# Patient Record
Sex: FEMALE | Race: WHITE | NOT HISPANIC OR LATINO | Employment: OTHER | URBAN - METROPOLITAN AREA
[De-identification: names, ages, dates, MRNs, and addresses within clinical notes are randomized per-mention and may not be internally consistent; named-entity substitution may affect disease eponyms.]

---

## 2017-01-09 ENCOUNTER — GENERIC CONVERSION - ENCOUNTER (OUTPATIENT)
Dept: OTHER | Facility: OTHER | Age: 82
End: 2017-01-09

## 2017-02-08 ENCOUNTER — GENERIC CONVERSION - ENCOUNTER (OUTPATIENT)
Dept: OTHER | Facility: OTHER | Age: 82
End: 2017-02-08

## 2017-02-17 ENCOUNTER — GENERIC CONVERSION - ENCOUNTER (OUTPATIENT)
Dept: OTHER | Facility: OTHER | Age: 82
End: 2017-02-17

## 2017-02-24 ENCOUNTER — GENERIC CONVERSION - ENCOUNTER (OUTPATIENT)
Dept: OTHER | Facility: OTHER | Age: 82
End: 2017-02-24

## 2017-02-24 ENCOUNTER — LAB CONVERSION - ENCOUNTER (OUTPATIENT)
Dept: OTHER | Facility: OTHER | Age: 82
End: 2017-02-24

## 2017-02-24 LAB — HBA1C MFR BLD HPLC: 7.6 % OF TOTAL HGB

## 2017-03-31 ENCOUNTER — GENERIC CONVERSION - ENCOUNTER (OUTPATIENT)
Dept: OTHER | Facility: OTHER | Age: 82
End: 2017-03-31

## 2017-04-17 ENCOUNTER — GENERIC CONVERSION - ENCOUNTER (OUTPATIENT)
Dept: OTHER | Facility: OTHER | Age: 82
End: 2017-04-17

## 2017-04-28 ENCOUNTER — ALLSCRIPTS OFFICE VISIT (OUTPATIENT)
Dept: OTHER | Facility: OTHER | Age: 82
End: 2017-04-28

## 2017-06-21 ENCOUNTER — HOSPITAL ENCOUNTER (OUTPATIENT)
Dept: BONE DENSITY | Facility: IMAGING CENTER | Age: 82
Discharge: HOME | End: 2017-06-21
Payer: COMMERCIAL

## 2017-06-21 DIAGNOSIS — Z12.31 ENCOUNTER FOR SCREENING MAMMOGRAM FOR MALIGNANT NEOPLASM OF BREAST: ICD-10-CM

## 2017-06-21 PROCEDURE — G0202 SCR MAMMO BI INCL CAD: HCPCS

## 2017-07-03 ENCOUNTER — ALLSCRIPTS OFFICE VISIT (OUTPATIENT)
Dept: OTHER | Facility: OTHER | Age: 82
End: 2017-07-03

## 2017-08-03 ENCOUNTER — GENERIC CONVERSION - ENCOUNTER (OUTPATIENT)
Dept: OTHER | Facility: OTHER | Age: 82
End: 2017-08-03

## 2017-08-03 ENCOUNTER — LAB CONVERSION - ENCOUNTER (OUTPATIENT)
Dept: OTHER | Facility: OTHER | Age: 82
End: 2017-08-03

## 2017-08-03 LAB
A/G RATIO (HISTORICAL): 1.3 (CALC) (ref 1–2.5)
ALBUMIN SERPL BCP-MCNC: 3.8 G/DL (ref 3.6–5.1)
ALP SERPL-CCNC: 52 U/L (ref 33–130)
ALT SERPL W P-5'-P-CCNC: 101 U/L (ref 6–29)
AST SERPL W P-5'-P-CCNC: 77 U/L (ref 10–35)
BILIRUB SERPL-MCNC: 0.6 MG/DL (ref 0.2–1.2)
BUN SERPL-MCNC: 14 MG/DL (ref 7–25)
BUN/CREA RATIO (HISTORICAL): ABNORMAL (CALC) (ref 6–22)
CALCIUM SERPL-MCNC: 9.1 MG/DL (ref 8.6–10.4)
CHLORIDE SERPL-SCNC: 100 MMOL/L (ref 98–110)
CO2 SERPL-SCNC: 31 MMOL/L (ref 20–31)
CREAT SERPL-MCNC: 0.87 MG/DL (ref 0.6–0.88)
EGFR AFRICAN AMERICAN (HISTORICAL): 73 ML/MIN/1.73M2
EGFR-AMERICAN CALC (HISTORICAL): 63 ML/MIN/1.73M2
GAMMA GLOBULIN (HISTORICAL): 3 G/DL (CALC) (ref 1.9–3.7)
GLUCOSE (HISTORICAL): 154 MG/DL (ref 65–99)
HBA1C MFR BLD HPLC: 8 % OF TOTAL HGB
POTASSIUM SERPL-SCNC: 3.9 MMOL/L (ref 3.5–5.3)
SODIUM SERPL-SCNC: 137 MMOL/L (ref 135–146)
TOTAL PROTEIN (HISTORICAL): 6.8 G/DL (ref 6.1–8.1)

## 2017-08-14 ENCOUNTER — GENERIC CONVERSION - ENCOUNTER (OUTPATIENT)
Dept: OTHER | Facility: OTHER | Age: 82
End: 2017-08-14

## 2017-09-21 ENCOUNTER — TRANSCRIBE ORDERS (OUTPATIENT)
Dept: ADMINISTRATIVE | Facility: HOSPITAL | Age: 82
End: 2017-09-21

## 2017-09-21 ENCOUNTER — ALLSCRIPTS OFFICE VISIT (OUTPATIENT)
Dept: OTHER | Facility: OTHER | Age: 82
End: 2017-09-21

## 2017-09-21 DIAGNOSIS — Z12.31 VISIT FOR SCREENING MAMMOGRAM: Primary | ICD-10-CM

## 2017-11-02 ENCOUNTER — GENERIC CONVERSION - ENCOUNTER (OUTPATIENT)
Dept: OTHER | Facility: OTHER | Age: 82
End: 2017-11-02

## 2017-11-02 ENCOUNTER — APPOINTMENT (OUTPATIENT)
Dept: NON INVASIVE DIAGNOSTICS | Facility: HOSPITAL | Age: 82
End: 2017-11-02
Payer: COMMERCIAL

## 2017-11-02 ENCOUNTER — APPOINTMENT (EMERGENCY)
Dept: RADIOLOGY | Facility: HOSPITAL | Age: 82
End: 2017-11-02
Payer: COMMERCIAL

## 2017-11-02 ENCOUNTER — HOSPITAL ENCOUNTER (OUTPATIENT)
Facility: HOSPITAL | Age: 82
Setting detail: OBSERVATION
Discharge: HOME | End: 2017-11-02
Attending: EMERGENCY MEDICINE | Admitting: INTERNAL MEDICINE
Payer: COMMERCIAL

## 2017-11-02 VITALS
TEMPERATURE: 98.4 F | WEIGHT: 174.38 LBS | OXYGEN SATURATION: 95 % | RESPIRATION RATE: 18 BRPM | DIASTOLIC BLOOD PRESSURE: 68 MMHG | BODY MASS INDEX: 29.77 KG/M2 | SYSTOLIC BLOOD PRESSURE: 122 MMHG | HEART RATE: 82 BPM | HEIGHT: 64 IN

## 2017-11-02 DIAGNOSIS — R07.9 CHEST PAIN: Primary | ICD-10-CM

## 2017-11-02 PROBLEM — E78.5 HYPERLIPIDEMIA: Status: ACTIVE | Noted: 2017-11-02

## 2017-11-02 PROBLEM — E11.9 DIABETES MELLITUS (HCC): Status: ACTIVE | Noted: 2017-11-02

## 2017-11-02 PROBLEM — I10 HYPERTENSION: Status: ACTIVE | Noted: 2017-11-02

## 2017-11-02 LAB
ANION GAP SERPL CALCULATED.3IONS-SCNC: 7 MMOL/L (ref 4–13)
ATRIAL RATE: 81 BPM
ATRIAL RATE: 83 BPM
BASOPHILS # BLD AUTO: 0.03 THOUSANDS/ΜL (ref 0–0.1)
BASOPHILS NFR BLD AUTO: 0 % (ref 0–1)
BUN SERPL-MCNC: 15 MG/DL (ref 5–25)
CALCIUM SERPL-MCNC: 8.8 MG/DL (ref 8.3–10.1)
CHLORIDE SERPL-SCNC: 101 MMOL/L (ref 100–108)
CO2 SERPL-SCNC: 30 MMOL/L (ref 21–32)
CREAT SERPL-MCNC: 0.97 MG/DL (ref 0.6–1.3)
EOSINOPHIL # BLD AUTO: 0.13 THOUSAND/ΜL (ref 0–0.61)
EOSINOPHIL NFR BLD AUTO: 2 % (ref 0–6)
ERYTHROCYTE [DISTWIDTH] IN BLOOD BY AUTOMATED COUNT: 15.3 % (ref 11.6–15.1)
GFR SERPL CREATININE-BSD FRML MDRD: 55 ML/MIN/1.73SQ M
GLUCOSE SERPL-MCNC: 171 MG/DL (ref 65–140)
HCT VFR BLD AUTO: 40.7 % (ref 34.8–46.1)
HGB BLD-MCNC: 13.1 G/DL (ref 11.5–15.4)
LYMPHOCYTES # BLD AUTO: 2.5 THOUSANDS/ΜL (ref 0.6–4.47)
LYMPHOCYTES NFR BLD AUTO: 34 % (ref 14–44)
MCH RBC QN AUTO: 28.1 PG (ref 26.8–34.3)
MCHC RBC AUTO-ENTMCNC: 32.2 G/DL (ref 31.4–37.4)
MCV RBC AUTO: 87 FL (ref 82–98)
MONOCYTES # BLD AUTO: 0.6 THOUSAND/ΜL (ref 0.17–1.22)
MONOCYTES NFR BLD AUTO: 8 % (ref 4–12)
NEUTROPHILS # BLD AUTO: 4.12 THOUSANDS/ΜL (ref 1.85–7.62)
NEUTS SEG NFR BLD AUTO: 56 % (ref 43–75)
P AXIS: 61 DEGREES
P AXIS: 63 DEGREES
PLATELET # BLD AUTO: 191 THOUSANDS/UL (ref 149–390)
PMV BLD AUTO: 10.5 FL (ref 8.9–12.7)
POTASSIUM SERPL-SCNC: 3.6 MMOL/L (ref 3.5–5.3)
PR INTERVAL: 168 MS
PR INTERVAL: 168 MS
QRS AXIS: -33 DEGREES
QRS AXIS: -37 DEGREES
QRSD INTERVAL: 104 MS
QRSD INTERVAL: 106 MS
QT INTERVAL: 384 MS
QT INTERVAL: 414 MS
QTC INTERVAL: 446 MS
QTC INTERVAL: 486 MS
RBC # BLD AUTO: 4.67 MILLION/UL (ref 3.81–5.12)
SODIUM SERPL-SCNC: 138 MMOL/L (ref 136–145)
T WAVE AXIS: 116 DEGREES
T WAVE AXIS: 82 DEGREES
TROPONIN I SERPL-MCNC: <0.02 NG/ML
VENTRICULAR RATE: 81 BPM
VENTRICULAR RATE: 83 BPM
WBC # BLD AUTO: 7.38 THOUSAND/UL (ref 4.31–10.16)

## 2017-11-02 PROCEDURE — G8978 MOBILITY CURRENT STATUS: HCPCS

## 2017-11-02 PROCEDURE — 84484 ASSAY OF TROPONIN QUANT: CPT | Performed by: EMERGENCY MEDICINE

## 2017-11-02 PROCEDURE — 80048 BASIC METABOLIC PNL TOTAL CA: CPT | Performed by: EMERGENCY MEDICINE

## 2017-11-02 PROCEDURE — 93005 ELECTROCARDIOGRAM TRACING: CPT | Performed by: EMERGENCY MEDICINE

## 2017-11-02 PROCEDURE — 36415 COLL VENOUS BLD VENIPUNCTURE: CPT | Performed by: EMERGENCY MEDICINE

## 2017-11-02 PROCEDURE — 99285 EMERGENCY DEPT VISIT HI MDM: CPT

## 2017-11-02 PROCEDURE — 94760 N-INVAS EAR/PLS OXIMETRY 1: CPT

## 2017-11-02 PROCEDURE — 97162 PT EVAL MOD COMPLEX 30 MIN: CPT

## 2017-11-02 PROCEDURE — 71020 HB CHEST X-RAY 2VW FRONTAL&LATL: CPT

## 2017-11-02 PROCEDURE — 93306 TTE W/DOPPLER COMPLETE: CPT

## 2017-11-02 PROCEDURE — 84484 ASSAY OF TROPONIN QUANT: CPT | Performed by: INTERNAL MEDICINE

## 2017-11-02 PROCEDURE — G8979 MOBILITY GOAL STATUS: HCPCS

## 2017-11-02 PROCEDURE — 85025 COMPLETE CBC W/AUTO DIFF WBC: CPT | Performed by: EMERGENCY MEDICINE

## 2017-11-02 PROCEDURE — 93005 ELECTROCARDIOGRAM TRACING: CPT

## 2017-11-02 PROCEDURE — G8980 MOBILITY D/C STATUS: HCPCS

## 2017-11-02 RX ORDER — PANTOPRAZOLE SODIUM 40 MG/1
40 TABLET, DELAYED RELEASE ORAL
Status: DISCONTINUED | OUTPATIENT
Start: 2017-11-03 | End: 2017-11-02 | Stop reason: HOSPADM

## 2017-11-02 RX ORDER — HEPARIN SODIUM 5000 [USP'U]/ML
5000 INJECTION, SOLUTION INTRAVENOUS; SUBCUTANEOUS EVERY 8 HOURS SCHEDULED
Status: DISCONTINUED | OUTPATIENT
Start: 2017-11-02 | End: 2017-11-02 | Stop reason: HOSPADM

## 2017-11-02 RX ORDER — UREA 10 %
100 LOTION (ML) TOPICAL DAILY
Status: DISCONTINUED | OUTPATIENT
Start: 2017-11-02 | End: 2017-11-02 | Stop reason: HOSPADM

## 2017-11-02 RX ORDER — ACETAMINOPHEN 325 MG/1
650 TABLET ORAL EVERY 6 HOURS PRN
Status: DISCONTINUED | OUTPATIENT
Start: 2017-11-02 | End: 2017-11-02 | Stop reason: HOSPADM

## 2017-11-02 RX ORDER — METOPROLOL SUCCINATE 25 MG/1
25 TABLET, EXTENDED RELEASE ORAL DAILY
Status: DISCONTINUED | OUTPATIENT
Start: 2017-11-02 | End: 2017-11-02 | Stop reason: HOSPADM

## 2017-11-02 RX ORDER — ATORVASTATIN CALCIUM 40 MG/1
40 TABLET, FILM COATED ORAL
Status: DISCONTINUED | OUTPATIENT
Start: 2017-11-02 | End: 2017-11-02 | Stop reason: HOSPADM

## 2017-11-02 RX ORDER — ASPIRIN 81 MG/1
81 TABLET ORAL DAILY
Status: DISCONTINUED | OUTPATIENT
Start: 2017-11-02 | End: 2017-11-02 | Stop reason: HOSPADM

## 2017-11-02 RX ORDER — NITROGLYCERIN 0.4 MG/1
0.4 TABLET SUBLINGUAL
Status: DISCONTINUED | OUTPATIENT
Start: 2017-11-02 | End: 2017-11-02 | Stop reason: HOSPADM

## 2017-11-02 RX ORDER — AMITRIPTYLINE HYDROCHLORIDE 10 MG/1
10 TABLET, FILM COATED ORAL
Status: DISCONTINUED | OUTPATIENT
Start: 2017-11-02 | End: 2017-11-02 | Stop reason: HOSPADM

## 2017-11-02 RX ORDER — INDAPAMIDE 1.25 MG/1
1.25 TABLET, FILM COATED ORAL DAILY
Status: DISCONTINUED | OUTPATIENT
Start: 2017-11-02 | End: 2017-11-02 | Stop reason: HOSPADM

## 2017-11-02 RX ORDER — ROPINIROLE 2 MG/1
2 TABLET, FILM COATED ORAL
Status: DISCONTINUED | OUTPATIENT
Start: 2017-11-02 | End: 2017-11-02 | Stop reason: HOSPADM

## 2017-11-02 RX ORDER — 0.9 % SODIUM CHLORIDE 0.9 %
3 VIAL (ML) INJECTION AS NEEDED
Status: DISCONTINUED | OUTPATIENT
Start: 2017-11-02 | End: 2017-11-02 | Stop reason: HOSPADM

## 2017-11-02 RX ORDER — ONDANSETRON 2 MG/ML
4 INJECTION INTRAMUSCULAR; INTRAVENOUS EVERY 6 HOURS PRN
Status: DISCONTINUED | OUTPATIENT
Start: 2017-11-02 | End: 2017-11-02 | Stop reason: HOSPADM

## 2017-11-02 NOTE — ED PROVIDER NOTES
History  Chief Complaint   Patient presents with    Chest Pain     Patient presents to the ER stating she started with left sided chest pain yesterday, went away then woke her up this am  presently denies any pain, N, V, or diaporesis or SOB  History provided by:  Patient   used: No       patient is a 74-year-old female presenting to the emergency department with chest pain  Started today morning  Woke up from sleep  Last for 2 hours  Associated diaphoresis  No nausea no lightheadedness no shortness of breath  Had similar pain 4 days ago  Took aspirin and felt better  Has not had any recent stress test or catheterization is  No stents  Has diabetes  Hyperlipidemia  No smoking  Pain is dull with episodes of shortness  No calf pain or swelling  No history of DVT  No cough  No bloody sputum  No fevers  MDM  Cardiac workup, admission to hospital for observation      Prior to Admission Medications   Prescriptions Last Dose Informant Patient Reported? Taking? Co-Enzyme Q10 200 MG CAPS 11/1/2017 at Unknown time Self Yes Yes   Sig: Co-Enzyme Q-10 100 MG Oral Capsule   Refills: 0      , M D ; Active   Empagliflozin (JARDIANCE PO) 11/1/2017 at Unknown time  Yes Yes   Sig: Take by mouth daily     GABAPENTIN PO 11/1/2017 at Unknown time Self Yes Yes   Sig: Take by mouth 3 (three) times a day     Multiple Vitamin (MULTI VITAMIN DAILY PO) 11/1/2017 at Unknown time  Yes Yes   Sig: Take by mouth   amitriptyline (ELAVIL) 10 mg tablet 11/1/2017 at Unknown time Self Yes Yes   Sig: Take by mouth 2 (two) times a day     ascorbic acid (VITAMIN C) 250 mg tablet 11/1/2017 at Unknown time  Yes Yes   Sig: Take 250 mg by mouth daily   aspirin 81 MG tablet 11/1/2017 at Unknown time  Yes Yes   Sig: Aspirin 81 MG Oral Tablet  Take 1 tablet daily   Refills: 0       Be, Joe Unger ;  Active   atorvastatin (LIPITOR) 40 mg tablet 11/1/2017 at Unknown time  Yes Yes   Sig: Take by mouth daily cyanocobalamin ([employer] VITAMIN B-12) 100 MCG tablet 11/1/2017 at Unknown time  Yes Yes   Sig: Take by mouth daily     hyoscyamine (LEVBID) 0 375 mg 12 hr tablet 11/1/2017 at Unknown time  Yes Yes   Sig: Take by mouth daily at bedtime     indapamide (LOZOL) 1 25 mg tablet 11/1/2017 at Unknown time  Yes Yes   Sig: Take 1 25 mg by mouth daily     metoprolol succinate (TOPROL-XL) 25 mg 24 hr tablet 11/1/2017 at Unknown time  Yes Yes   Sig: Take by mouth daily     rOPINIRole (REQUIP) 2 mg tablet 11/1/2017 at Unknown time  Yes Yes   Sig: Take 2 mg by mouth daily at bedtime     sitaGLIPtin (JANUVIA) 100 mg tablet 11/1/2017 at Unknown time  Yes Yes   Sig: Take 100 mg by mouth daily        Facility-Administered Medications: None       Past Medical History:   Diagnosis Date    Breast cancer ([facility] [address] ) 2005    h/o     Cataract     OD    Diabetes mellitus ([facility] [address] )     Full dentures     Heart palpitations     Hyperlipidemia     Hypertension     Limb alert care status     LEFT    Wears glasses        Past Surgical History:   Procedure Laterality Date    BREAST SURGERY      CATARACT EXTRACTION W/  INTRAOCULAR LENS IMPLANT Left     CHOLECYSTECTOMY      HERNIA REPAIR      HYSTERECTOMY      JOINT REPLACEMENT      fito knee replacements    MASTECTOMY Left     w/SLN bx    LA REMV CATARACT EXTRACAP,INSERT LENS Right 11/16/2016    Procedure: EXTRACTION EXTRACAPSULAR CATARACT PHACO INTRAOCULAR LENS (IOL); Surgeon: Lashon Rodriguez MD;  Location: [facility];  Service: Ophthalmology    TONSILLECTOMY      VAGINAL DELIVERY      X 4       History reviewed  No pertinent family history  I have reviewed and agree with the history as documented  Social History   Substance Use Topics    Smoking status: Never Smoker    Smokeless tobacco: Never Used    Alcohol use No        Review of Systems   Constitutional: Positive for diaphoresis  Negative for chills and fever  HENT: Negative for congestion and sore throat      Respiratory: Negative for cough, shortness of breath, wheezing and stridor  Cardiovascular: Positive for chest pain  Negative for palpitations and leg swelling  Gastrointestinal: Negative for abdominal pain, blood in stool, diarrhea, nausea and vomiting  Genitourinary: Negative for dysuria, frequency and urgency  Musculoskeletal: Negative for neck pain and neck stiffness  Skin: Negative for pallor and rash  Neurological: Negative for dizziness, syncope, weakness, light-headedness and headaches  All other systems reviewed and are negative  Physical Exam  ED Triage Vitals [11/02/17 0945]   Temperature Pulse Respirations Blood Pressure SpO2   98 3 °F (36 8 °C) 88 18 142/70 100 %      Temp Source Heart Rate Source Patient Position - Orthostatic VS BP Location FiO2 (%)   Tympanic Monitor Lying Right arm --      Pain Score       No Pain           Orthostatic Vital Signs  Vitals:    11/02/17 1215 11/02/17 1230 11/02/17 1253 11/02/17 1510   BP: 124/66  131/64 122/68   Pulse: 77 78 80 82   Patient Position - Orthostatic VS: Lying  Lying Lying       Physical Exam   Constitutional: She is oriented to person, place, and time  She appears well-developed and well-nourished  HENT:   Head: Normocephalic and atraumatic  Eyes: EOM are normal  Pupils are equal, round, and reactive to light  Neck: Normal range of motion  Neck supple  Cardiovascular: Normal rate, regular rhythm, normal heart sounds and intact distal pulses  Pulmonary/Chest: Effort normal and breath sounds normal  No respiratory distress  Abdominal: Soft  Bowel sounds are normal  There is no tenderness  Musculoskeletal: Normal range of motion  She exhibits no edema or tenderness  Neurological: She is alert and oriented to person, place, and time  Skin: Skin is warm and dry  Capillary refill takes less than 2 seconds  No rash noted  No erythema  Vitals reviewed        ED Medications  Medications - No data to display    Diagnostic Studies  Results Reviewed     Procedure Component Value Units Date/Time    Troponin I [[id number]]  (Normal) Collected:  11/02/17 1002    Lab Status:  Final result Specimen:  Blood from Arm, Right Updated:  11/02/17 1054     Troponin I <0 02 ng/mL     Narrative:         [employer] Chemistry analyzer 99% cutoff is > 0 04 ng/mL in network labs    o cTnI 99% cutoff is useful only when applied to patients in the clinical setting of myocardial ischemia  o cTnI 99% cutoff should be interpreted in the context of clinical history, ECG findings and possibly cardiac imaging to establish correct diagnosis  o cTnI 99% cutoff may be suggestive but clearly not indicative of a coronary event without the clinical setting of myocardial ischemia  Basic metabolic panel [[id number]]  (Abnormal) Collected:  11/02/17 1002    Lab Status:  Final result Specimen:  Blood from Arm, Right Updated:  11/02/17 1044     Sodium 138 mmol/L      Potassium 3 6 mmol/L      Chloride 101 mmol/L      CO2 30 mmol/L      Anion Gap 7 mmol/L      BUN 15 mg/dL      Creatinine 0 97 mg/dL      Glucose 171 (H) mg/dL      Calcium 8 8 mg/dL      eGFR 55 ml/min/1 73sq m     Narrative:         National Kidney Disease Education Program recommendations are as follows:  GFR calculation is accurate only with a steady state creatinine  Chronic Kidney disease less than 60 ml/min/1 73 sq  meters  Kidney failure less than 15 ml/min/1 73 sq  meters      CBC and differential [[id number]]  (Abnormal) Collected:  11/02/17 1002    Lab Status:  Final result Specimen:  Blood from Arm, Right Updated:  11/02/17 1034     WBC 7 38 Thousand/uL      RBC 4 67 Million/uL      Hemoglobin 13 1 g/dL      Hematocrit 40 7 %      MCV 87 fL      MCH 28 1 pg      MCHC 32 2 g/dL      RDW 15 3 (H) %      MPV 10 5 fL      Platelets 999 Thousands/uL      Neutrophils Relative 56 %      Lymphocytes Relative 34 %      Monocytes Relative 8 %      Eosinophils Relative 2 %      Basophils Relative 0 % Neutrophils Absolute 4 12 Thousands/µL      Lymphocytes Absolute 2 50 Thousands/µL      Monocytes Absolute 0 60 Thousand/µL      Eosinophils Absolute 0 13 Thousand/µL      Basophils Absolute 0 03 Thousands/µL                  X-ray chest 2 views   Final Result by Abelardo Andre DO (11/02 1026)      No active pulmonary disease  Workstation performed: [id number]                    Procedures  Procedures       Phone Contacts  ED Phone Contact    ED Course  ED Course as of Nov 03 0710   Thu Nov 02, 2017   0957  ECG shows normal sinus rhythm,  left axis deviation axis,  white QRS, nonspecific, nonspecific ST and T changes compared to previous EKG flipped T-wave in aVL    1014  Patient took aspirin before coming to ER, full dose          HEART Risk Score    Flowsheet Row Most Recent Value   History  1 Filed at: 11/02/2017 0955   ECG  1 Filed at: 11/02/2017 0748   Age  2 Filed at: 11/02/2017 0955   Risk Factors  1 Filed at: 11/02/2017 0955   Troponin  0 Filed at: 11/02/2017 0955   Heart Score Risk Calculator   History  1 Filed at: 11/02/2017 0955   ECG  1 Filed at: 11/02/2017 0955   Age  2 Filed at: 11/02/2017 0955   Risk Factors  1 Filed at: 11/02/2017 0955   Troponin  0 Filed at: 11/02/2017 0955   HEART Score  5 Filed at: 11/02/2017 0955   HEART Score  5 Filed at: 11/02/2017 4134                            MDM  CritCare Time    Disposition  Final diagnoses:   Chest pain     Time reflects when diagnosis was documented in both MDM as applicable and the Disposition within this note     Time User Action Codes Description Comment    11/2/2017 11:49 AM Judy Cortes Add [R07 9] Chest pain     11/2/2017  1:15 PM John Ramachandran Modify [R07 9] Chest pain       ED Disposition     ED Disposition Condition Comment    Admit  Case was discussed with LAURA and the patient's admission status was agreed to be Admission Status: observation status to the service of Dr Madelyn Crabtree           Follow-up Information     Follow up With Specialties Details Why Contact Info    Martha Palacios MD Family Medicine Follow up in 1 week(s)  [address]  [facility] [address] [facility]      Zohra Rogers MD Cardiology, Multidisciplinary Follow up in 1 week(s)  [address] [facility]  [facility] [address]  [phone]          Discharge Medication List as of 11/2/2017  6:26 PM      CONTINUE these medications which have NOT CHANGED    Details   amitriptyline (ELAVIL) 10 mg tablet Take by mouth 2 (two) times a day  , Starting Thu 1/14/2016, Historical Med      ascorbic acid (VITAMIN C) 250 mg tablet Take 250 mg by mouth daily, Until Discontinued, Historical Med      aspirin 81 MG tablet Aspirin 81 MG Oral Tablet  Take 1 tablet daily   Refills: 0       Be, Juanito Aceves ;  Active, Historical Med      atorvastatin (LIPITOR) 40 mg tablet Take by mouth daily  , Starting Fri 5/20/2016, Historical Med      Co-Enzyme Q10 200 MG CAPS Co-Enzyme Q-10 100 MG Oral Capsule   Refills: 0      , M D ; Active, Historical Med      cyanocobalamin ([employer] VITAMIN B-12) 100 MCG tablet Take by mouth daily  , Starting Thu 1/14/2016, Historical Med      Empagliflozin (JARDIANCE PO) Take by mouth daily  , Historical Med      GABAPENTIN PO Take by mouth 3 (three) times a day  , Historical Med      hyoscyamine (LEVBID) 0 375 mg 12 hr tablet Take by mouth daily at bedtime  , Starting Thu 1/14/2016, Historical Med      indapamide (LOZOL) 1 25 mg tablet Take 1 25 mg by mouth daily  , Historical Med      metoprolol succinate (TOPROL-XL) 25 mg 24 hr tablet Take by mouth daily  , Historical Med      Multiple Vitamin (MULTI VITAMIN DAILY PO) Take by mouth, Starting 1/14/2016, Until Discontinued, Historical Med      rOPINIRole (REQUIP) 2 mg tablet Take 2 mg by mouth daily at bedtime  , Starting Thu 1/14/2016, Historical Med      sitaGLIPtin (JANUVIA) 100 mg tablet Take 100 mg by mouth daily  , Starting Tue 2/16/2016, Historical Med             Outpatient Discharge Orders  Discharge Diet     Activity as tolerated     Call provider for:  difficulty breathing, headache or visual disturbances     Call provider for:  persistent nausea or vomiting     Call provider for:  persistent dizziness or light-headedness         ED Provider  Electronically Signed by           Luis Enrique Saul MD  11/03/17 0774

## 2017-11-02 NOTE — PHYSICAL THERAPY NOTE
PT eval     17 1545   Note Type   Note type Eval only   Pain Assessment   Pain Assessment No/denies pain   Pain Score No Pain   Home Living   Type of [address] Two level   Additional Comments packing to move soon spouse  recently   Prior Function   Level of [place] Independent with ADLs and functional mobility   Lives With Alone   Receives Help From Family   ADL Assistance Independent   IADLs Independent   Falls in the last 6 months 0   Vocational Retired   Restrictions/Precautions   [facility] Bearing Precautions Per Order No   General   Additional Pertinent History adm under obs for 2 episodes of chest pain; cleared by cardiolgy and tent for d/c   Family/Caregiver Present No   Cognition   Overall Cognitive Status WFL   Arousal/Participation Alert   Orientation Level Oriented X4   Memory Within functional limits   Following Commands Follows all commands and directions without difficulty   Comments hhoping to be d/c later today   RUE Assessment   RUE Assessment WFL   LUE Assessment   LUE Assessment WFL   RLE Assessment   RLE Assessment WFL   LLE Assessment   LLE Assessment WFL   Coordination   Movements are Fluid and Coordinated 1   Bed Mobility   Rolling R 7  Independent   Rolling L 7  Independent   Supine to Sit 7  Independent   Sit to Supine 7  Independent   Transfers   Sit to Stand 7  Independent   Stand to Sit 7  Independent   Stand pivot 7  Independent   Ambulation/Elevation   Gait pattern WNL   Gait Assistance 7  Independent   Assistive Device None   Distance 250'   Balance   Static Sitting Normal   Dynamic Sitting Normal   Static Standing Normal   Dynamic Standing Normal   Ambulatory Normal   Endurance Deficit   Endurance Deficit No   Activity Tolerance   Activity Tolerance Patient tolerated treatment well   Medical Staff Made Aware yes    Nurse Made Aware yes VSS no c/os  feels fine   Assessment   Prognosis Excellent   Assessment PT adm under obs for chest pain   Presents as an ind ambulator without device  No deficits noted adn appropraite for home d/c  MD and RN told   d/c PT no needs   Goals   Patient Goals go home   Plan   PT Frequency (d/c PT)   Recommendation   Recommendation Home with family support   PT - OK to Discharge Yes   Modified Abebe Scale   Modified [place]rens Scale 0   Barthel Index   Feeding 10   Bathing 5   Grooming Score 5   Dressing Score 10   Bladder Score 10   Bowels Score 10   Toilet Use Score 10   Transfers (Bed/Chair) Score 15   Mobility (Level Surface) Score 15   Stairs Score 10   Barthel Index Score 100   Sharmaine Bernabe, PT

## 2017-11-02 NOTE — DISCHARGE SUMMARY
Discharge Summary - Sukumar Henao [de-identified] y o  female MRN: [id number]  Unit/Bed#: [address] [place] [address] Encounter: [id number]    Admission Date:    11/2/2017   Discharge Date:   11/02/17   Admitting Diagnosis:   Chest pain [R07 9]  Admitting Provider:   Froilan Huggins MD  Discharge Provider:   Froilan Huggins MD     Primary Care Physician at Discharge:   Jasper Sandhoff, RV,[phone]    HPI:   74-year-old female who presented to emergency department with chest pain  For a detailed HPI please refer to this writer's admission note  Procedures Performed:   No orders of the defined types were placed in this encounter  Hospital Course:  Patient was admitted to med surgical floor on telemetry for observation status  Patient was seen by cardiologist Dr Corey Choudhary  Cardiologist thought patient's symptoms are likely related to gastroesophageal reflux and mechanical chest wall pain  He advised to discharge patient on PPI and he will arrange for outpatient follow-up  He did not think any testing is needed at this point  Patient the 3rd troponin was also negative  She remained chest pain-free  She was seen by Physical therapy and did not require any home care services  Patient will be discharged home in stable condition  Follow-up with PCP in 1 week  Follow-up with Cardiology as outpatient  Return to ER with any worsening shortness of breath, chest pain, palpitation or any other alarming symptoms      Consulting Providers   Cardiology    Complications:  None    Labs:   Lab Results   Component Value Date    WBC 7 38 11/02/2017    WBC 8 88 10/01/2014    RBC 4 67 11/02/2017    RBC 3 72 (L) 10/01/2014    HGB 13 1 11/02/2017    HGB 10 2 (L) 10/01/2014    HCT 40 7 11/02/2017    HCT 31 7 (L) 10/01/2014    MCV 87 11/02/2017    MCV 85 10/01/2014    MCH 28 1 11/02/2017    MCH 27 4 10/01/2014    RDW 15 3 (H) 11/02/2017    RDW 16 1 (H) 10/01/2014     11/02/2017     10/01/2014     Lab Results   Component Value Date CREATININE 0 97 11/02/2017    CREATININE 0 88 09/25/2015    BUN 15 11/02/2017    BUN 16 09/25/2015     11/02/2017     09/25/2015    K 3 6 11/02/2017    K 4 1 09/25/2015     11/02/2017     09/25/2015    CO2 30 11/02/2017    CO2 32 7 (H) 09/25/2015    GLUCOSE 171 (H) 11/02/2017    GLUCOSE 148 (H) 09/25/2015    PROT 7 2 10/31/2016    PROT 7 0 09/25/2015    ALKPHOS 63 10/31/2016    ALKPHOS 65 09/25/2015    ALT 32 10/31/2016    ALT 53 09/25/2015    AST 24 10/31/2016    AST 32 09/25/2015       Treatments:  Aspirin, Lipitor, Toprol-XL, Requip and Cardiology consult    Discharge Diagnosis:   Principal Problem:    Diabetes mellitus ([facility] [address] )  Active Problems:    Hypertension    Hyperlipidemia  Resolved Problems:    Chest pain      Condition at Discharge:   Good     Code Status: Level 1 - Full Code  Advance Directive and Living Will: <no information>  Power of :    POLST:      Discharge instructions/Information to patient and family:   See after visit summary for information provided to patient and family  Provisions for Follow-Up Care:  See after visit summary for information related to follow-up care and any pertinent home health orders  Disposition:   Home    Planned Readmission:   No    Discharge Statement   I spent 35 minutes discharging the patient  This time was spent on the day of discharge  I had direct contact with the patient on the day of discharge  Greater than 50% of the total time was spent examining patient, answering all patient questions, arranging and discussing plan of care with patient as well as directly providing post-discharge instructions  Additional time then spent on discharge activities  Discharge Medications:  See after visit summary for reconciled discharge medications provided to patient and family        "This note has been constructed using a voice recognition system"    Jatin Lilly MD  11/2/2017,3:50 PM

## 2017-11-02 NOTE — DISCHARGE INSTRUCTIONS
Follow-up with PCP in 1 week  Follow-up with Cardiology as outpatient  Return to ER with any worsening shortness of breath, chest pain, palpitation or any other alarming symptoms

## 2017-11-02 NOTE — RESPIRATORY THERAPY NOTE
RT Protocol Note  Yelena Winter 82 y o  female MRN: [id number]  Unit/Bed#: [address] [place] [id number] Encounter: [id number]    Assessment    Principal Problem:    Chest pain  Active Problems:    Diabetes mellitus ([facility] )    Hypertension    Hyperlipidemia      Home Pulmonary Medications:  none    Past Medical History:   Diagnosis Date    Breast cancer ([facility] ) 2005    h/o     Cataract     OD    Diabetes mellitus ([facility] )     Full dentures     Heart palpitations     Hyperlipidemia     Hypertension     Limb alert care status     LEFT    Wears glasses      Social History     Social History    Marital status:      Spouse name: N/A    Number of children: N/A    Years of education: N/A     Social History Main Topics    Smoking status: Never Smoker    Smokeless tobacco: Never Used    Alcohol use No    Drug use: No    Sexual activity: No     Other Topics Concern    None     Social History Narrative    None       Subjective         Objective    Physical Exam:   Assessment Type: Assess only  General Appearance: Awake  Respiratory Pattern: Normal  Chest Assessment: Chest expansion symmetrical  Bilateral Breath Sounds: Clear  Cough: Non-productive    Vitals:  Blood pressure 131/64, pulse 80, temperature 97 8 °F (36 6 °C), temperature source Oral, resp  rate 17, height 5' 4" (1 626 m), weight 79 1 kg (174 lb 6 1 oz), SpO2 96 %            Imaging and other studies: I have personally reviewed pertinent films in PACS          Plan    Respiratory Plan: Discontinue Protocol (cxr clear/bbs clear/no pulm hx/)

## 2017-11-02 NOTE — H&P
History and Physical  Yelena Winter [de-identified] y o  female MRN: [id number]  Unit/Bed#: [address] [place] [address] [id number] Encounter: [id number]    Admitting Diagnosis:   Principal Problem:    Chest pain  Active Problems:    Diabetes mellitus ([facility] )    Hypertension    Hyperlipidemia  Resolved Problems:    * No resolved hospital problems  *      Plan:  Admit to med surgical floor on telemetry as observation status  · Chest pain r/o ACS  Serial troponin, EKG and echocardiogram  Cardiology consult  Oxygen supplementation and bronchodilators p r n  Lipid profile in a m  Continue on aspirin, Toprol-XL, statin  · Hypertension, hyperlipidemia and diabetes mellitus  Accu-Chek a c  HS with Humalog sliding scale  Continue on Januvia  Continue on lozol, Toprol-XL, Lipitor  Continue on Requip and amitriptyline  · DVT prophylaxis  Discussed with patient in detail  Anticipated length of stay less than 2 midnights  Chief Complaint   Patient presents with    Chest Pain     Patient presents to the ER stating she started with left sided chest pain yesterday, went away then woke her up this am  presently denies any pain, N, V, or diaporesis or SOB  HPI:  Sergo Ashby is a [de-identified] y o  female who presented to the emergency department with chest pain  Patient states that she woke up this morning at 5:00 a m  with chest pain which was left-sided in location, 4/10 in intensity, sharp, constant, nonradiating  Chest pain did not get worse with inspiration or movement  Patient states that she had similar episode on Monday which lasted few hours and then subsequently subsided  Patient had no associated nausea, diaphoresis, palpitations  Patient does complain of having dyspnea on exertion    Denies any fever, chills, cough, headache, dizziness, nausea, vomiting, abdominal pain, diarrhea or urinary complaints    Historical Information   Past Medical History:   Diagnosis Date    [facility] ) 2005    h/o     Cataract     OD    Diabetes mellitus ([address] )     Full dentures     Heart palpitations     Hyperlipidemia     Hypertension     Limb alert care status     LEFT    Wears glasses      Past Surgical History:   Procedure Laterality Date    BREAST SURGERY      CATARACT EXTRACTION W/  INTRAOCULAR LENS IMPLANT Left     CHOLECYSTECTOMY      HERNIA REPAIR      HYSTERECTOMY      JOINT REPLACEMENT      fito knee replacements    MASTECTOMY Left     w/SLN bx    [place] REMV CATARACT EXTRACAP,INSERT LENS Right 11/16/2016    Procedure: EXTRACTION EXTRACAPSULAR CATARACT PHACO INTRAOCULAR LENS (IOL); Surgeon: Brian Arellano MD;  Location:  MAIN OR;  Service: Ophthalmology    TONSILLECTOMY      VAGINAL DELIVERY      X 4     Social History   History   Alcohol Use No     History   Drug Use No     History   Smoking Status    Never Smoker   Smokeless Tobacco    Never Used     History reviewed  No pertinent family history  Meds/Allergies   Allergies   Allergen Reactions    Other      Latex  Other reaction(s): Rash/redness    Hydrocodone-Acetaminophen GI Intolerance       Meds:    Current Facility-Administered Medications:     Insert peripheral IV, , , Once **AND** sodium chloride (PF) 0 9 % injection 3 mL, 3 mL, Intravenous, PRN, Judy MD Sophia    Prescriptions Prior to Admission   Medication    Empagliflozin (JARDIANCE PO)    GABAPENTIN PO    amitriptyline (ELAVIL) 10 mg tablet    ascorbic acid (VITAMIN C) 250 mg tablet    aspirin 81 MG tablet    atorvastatin (LIPITOR) 40 mg tablet    Co-Enzyme Q10 200 MG CAPS    cyanocobalamin ([employer] VITAMIN B-12) 100 MCG tablet    hyoscyamine (LEVBID) 0 375 mg 12 hr tablet    indapamide (LOZOL) 1 25 mg tablet    metoprolol succinate (TOPROL-XL) 25 mg 24 hr tablet    Multiple Vitamin (MULTI VITAMIN DAILY PO)    rOPINIRole (REQUIP) 2 mg tablet    sitaGLIPtin (JANUVIA) 100 mg tablet         Review of Systems   Constitutional: Positive for fatigue  HENT: Negative  Eyes: Negative      Respiratory: Positive for shortness of breath  Cardiovascular: Positive for chest pain  Gastrointestinal: Negative  Endocrine: Negative  Genitourinary: Negative  Musculoskeletal: Negative  Skin: Negative  Allergic/Immunologic: Negative  Neurological: Negative  Hematological: Negative  Psychiatric/Behavioral: Negative  Current Vitals:   Blood Pressure: 124/66 (11/02/17 1215)  Pulse: 78 (11/02/17 1230)  Temperature: 98 3 °F (36 8 °C) (11/02/17 0945)  Temp Source: Tympanic (11/02/17 0945)  Respirations: 18 (11/02/17 1230)  Height: 5' 4" (162 6 cm) (11/02/17 0945)  Weight - Scale: 78 kg (172 lb) (11/02/17 0945)  SpO2: 98 % (11/02/17 1230)  SPO2 RA Rest    Flowsheet Row ED to Hosp-Admission (Current) from 11/2/2017 in [facility] Surg Unit   SpO2  98 %   SpO2 Activity  At Rest   O2 Device  None (Room air)   O2 Flow Rate  No data        No intake or output data in the 24 hours ending 11/02/17 1251  Body mass index is 29 52 kg/m²  Physical Exam   Constitutional: She is oriented to person, place, and time  She appears well-developed and well-nourished  No distress  HENT:   Head: Normocephalic and atraumatic  Nose: Nose normal    Eyes: Conjunctivae and EOM are normal  Pupils are equal, round, and reactive to light  No scleral icterus  Neck: Normal range of motion  Neck supple  No JVD present  No tracheal deviation present  Cardiovascular: Normal rate, regular rhythm, normal heart sounds and intact distal pulses  Pulmonary/Chest: Effort normal and breath sounds normal  She has no wheezes  She has no rales  Abdominal: Soft  Bowel sounds are normal  There is no tenderness  There is no rebound and no guarding  Musculoskeletal: Normal range of motion  She exhibits no edema, tenderness or deformity  Neurological: She is alert and oriented to person, place, and time  No cranial nerve deficit  No focal deficits   Skin: Skin is warm  No rash noted  No erythema  Psychiatric: She has a normal mood and affect  Thought content normal    Nursing note and vitals reviewed  Lab Results:   CBC:   Lab Results   Component Value Date    WBC 7 38 11/02/2017    HGB 13 1 11/02/2017    HCT 40 7 11/02/2017    MCV 87 11/02/2017     11/02/2017    MCH 28 1 11/02/2017    MCHC 32 2 11/02/2017    RDW 15 3 (H) 11/02/2017    MPV 10 5 11/02/2017    NRBC 0 10/01/2014     CMP:  Lab Results   Component Value Date     11/02/2017     09/25/2015     11/02/2017     09/25/2015    CO2 30 11/02/2017    CO2 32 7 (H) 09/25/2015    ANIONGAP 7 11/02/2017    ANIONGAP 4 09/25/2015    BUN 15 11/02/2017    BUN 16 09/25/2015    CREATININE 0 97 11/02/2017    CREATININE 0 88 09/25/2015    GLUCOSE 171 (H) 11/02/2017    GLUCOSE 148 (H) 09/25/2015    CALCIUM 8 8 11/02/2017    CALCIUM 9 1 09/25/2015    AST 24 10/31/2016    AST 32 09/25/2015    ALT 32 10/31/2016    ALT 53 09/25/2015    ALKPHOS 63 10/31/2016    ALKPHOS 65 09/25/2015    PROT 7 2 10/31/2016    PROT 7 0 09/25/2015    ALBUMIN 3 3 (L) 10/31/2016    ALBUMIN 3 5 09/25/2015    BILITOT 0 50 10/31/2016    BILITOT 0 36 09/25/2015    EGFR 55 11/02/2017     Lab Results   Component Value Date    TROPONINI <0 02 11/02/2017     Coagulation:   Lab Results   Component Value Date    INR 1 03 09/15/2014    Urinalysis:  Lab Results   Component Value Date    COLORU Yellow 09/15/2014    CLARITYU Clear 09/15/2014    SPECGRAV 1 010 09/15/2014    PHUR 6 5 09/15/2014    LEUKOCYTESUR Small (A) 09/15/2014    NITRITE Negative 09/15/2014    PROTEINUA Negative 09/15/2014    GLUCOSEU Negative 09/15/2014    KETONESU Negative 09/15/2014    BILIRUBINUR Negative 09/15/2014    BLOODU Negative 09/15/2014      Amylase: No results found for: AMYLASE  Lipase: No results found for: LIPASE     Imaging: X-ray Chest 2 Views    Result Date: 11/2/2017  Narrative: CHEST - DUAL ENERGY INDICATION:  Chest pain   COMPARISON:  Chest radiograph September 15, 2014 VIEWS:  PA (including soft tissue/bone algorithms) and lateral projections IMAGES:  4 FINDINGS:     The heart is enlarged  Atherosclerotic changes in the aorta  The lungs are clear  No pneumothorax or pleural effusion  Postoperative changes in the left breast and left axilla  Age-appropriate degenerative changes are noted in the spine  Osseous structures appear otherwise within normal limits  Impression: No active pulmonary disease  Workstation performed: [id number]     EKG, Pathology, and Other Studies: I have personally reviewed the results    VTE Pharmacologic Prophylaxis: Heparin  VTE Mechanical Prophylaxis: sequential compression device    Code Status: No Order       "This note has been constructed using a voice recognition system"      Rainer Nichols MD  11/2/2017, 12:51 PM

## 2017-11-02 NOTE — CONSULTS
Consultation - Cardiology   [facility] Winter [de-identified] y o  female MRN: [id number]  Unit/Bed#: [address] [place] [address] [id number] Encounter: [id number]      Assessment:  Principal Problem:    Chest pain  Active Problems:    Diabetes mellitus ([address] )    Hypertension    Hyperlipidemia    Premature ventricular contractions    Plan:    1  CP - This is atypical for angina  This pain has periods of sharp pain that only last a few seconds, and are sometimes worse with certain positions  She also had a cardiac catheterization several years back which was normal   A stress nuclear study from last year which was also normal   Likely etiologies to this include gastroesophageal reflux and mechanical chest wall pain  I advised her to go on a PPI regimen to see if this helps  We will arrange for outpatient follow-up  No testing is needed at this point time  An echocardiogram was done and we will review this  2  PVCs - These are well controlled on low-dose metoprolol  No changes were made from this standpoint  History of Present Illness   Physician Requesting Consult: Sohail Burks MD  Reason for Consult / Principal Problem:  Chest pain    HPI: Kelley Weber is a [de-identified]y o  year old female who presents to the emergency room this morning with a chief complaint of chest pain  Fady Shaw initial followed with me between 2010 - 2012 due to symptoms of lightheadedness and orthostasis  She was also having some atypical chest burning symptoms and palpitations  I had her undergo a workup at that time  Her Holter and echo were both essentially normal  Her stress nuclear study demonstrated a reversible anterior defect, but was also affected by breast attenuation artifact  Regardless I had her undergo a cardiac catheterization that demonstrated no significant obstructive coronary artery disease  She came back in some me last year because she was having some exertional dyspnea and continued with her chronic palpitations   At that time I had her undergo a workup again  Her stress nuclear and echo study was normal  She has normal LV systolic function  Her Holter monitor demonstrated about 1500 PVCs in a 48 hour period  We placed her on metoprolol, but her blood pressure was running a bit low and she was feeling better from a palpitation standpoint  Therefore we cut her metoprolol down to 25 mg daily  She has tolerated this nicely and her palpitations remained relatively controlled  I last saw her in the office in April  Pippa Clay comes in with 2 episodes of chest pain that occurred this week, most of them atypical   Earlier this week she experienced a dull sensation that would become sharp at times  It would wax and wane  It went away on its own but then came back this morning and more intensity  At its highest intensity it is sharp and only lasts a few seconds  But then she will have some intermittent dull lingering pain  She states to me that sometimes lying on her left side will make it worse and laying on her right side will make it go away  She denies any radiation of this pain  There is no shortness of breath during the pain  She does have chronic shortness of breath with exertion which has not changed in years  She denies any other signs or symptoms of CHF  Her palpitations have been under good control  No lightheadedness or any syncope  She has been under a great deal of stress in the last few months  She lost her , and has to give up her house and moving with her daughter  Consults    Review of Systems:  Please refer the HPI for all cardiac and pulmonary review of systems  All other 10 systems were reviewed and are negative      Historical Information   Past Medical History:   Diagnosis Date    Breast cancer ([facility] ) 2005    h/o     Cataract     OD    Diabetes mellitus ([facility] )     Full dentures     Heart palpitations     Hyperlipidemia     Hypertension     Limb alert care status     LEFT    Wears glasses      Past Surgical History:   Procedure Laterality Date    BREAST SURGERY      CATARACT EXTRACTION W/  INTRAOCULAR LENS IMPLANT Left     CHOLECYSTECTOMY      HERNIA REPAIR      HYSTERECTOMY      JOINT REPLACEMENT      fito knee replacements    MASTECTOMY Left     w/SLN bx    [place] REMV CATARACT EXTRACAP,INSERT LENS Right 11/16/2016    Procedure: EXTRACTION EXTRACAPSULAR CATARACT PHACO INTRAOCULAR LENS (IOL); Surgeon: Marcy Lara MD;  Location: [facility] OR;  Service: Ophthalmology    TONSILLECTOMY      VAGINAL DELIVERY      X 4     History   Alcohol Use No     History   Drug Use No     History   Smoking Status    Never Smoker   Smokeless Tobacco    Never Used     Family History: non-contributory    Meds/Allergies   all current active meds have been reviewed  Allergies   Allergen Reactions    Other      Latex  Other reaction(s): Rash/redness    Hydrocodone-Acetaminophen GI Intolerance       Objective   Vitals: Blood pressure 122/68, pulse 82, temperature 98 4 °F (36 9 °C), temperature source Oral, resp  rate 18, height 5' 4" (1 626 m), weight 79 1 kg (174 lb 6 1 oz), SpO2 95 %  , Body mass index is 29 93 kg/m² , Orthostatic Blood Pressures    Flowsheet Row Most Recent Value   Blood Pressure  122/68 filed at 11/02/2017 1510   Patient Position - Orthostatic VS  Lying filed at 11/02/2017 1510          No intake or output data in the 24 hours ending 11/02/17 1516      Physical Exam:  GEN: Sergo Ashby appears well, alert and oriented x 3, pleasant and cooperative   HEENT: pupils equal, round, and reactive to light; extraocular muscles intact  NECK: supple, no carotid bruits   HEART: regular rhythm, normal S1 and S2, no murmurs, clicks, gallops or rubs   LUNGS: clear to auscultation bilaterally; no wheezes, rales, or rhonchi   ABDOMEN: normal bowel sounds, soft, no tenderness, no distention  EXTREMITIES: peripheral pulses normal; no clubbing, cyanosis, or edema  NEURO: no focal findings   SKIN: normal without suspicious lesions on exposed skin    Lab Results:   Admission on 11/02/2017   Component Date Value    WBC 11/02/2017 7 38     RBC 11/02/2017 4 67     Hemoglobin 11/02/2017 13 1     Hematocrit 11/02/2017 40 7     MCV 11/02/2017 87     MCH 11/02/2017 28 1     MCHC 11/02/2017 32 2     RDW 11/02/2017 15 3*    MPV 11/02/2017 10 5     Platelets 97/45/0258 191     Neutrophils Relative 11/02/2017 56     Lymphocytes Relative 11/02/2017 34     Monocytes Relative 11/02/2017 8     Eosinophils Relative 11/02/2017 2     Basophils Relative 11/02/2017 0     Neutrophils Absolute 11/02/2017 4 12     Lymphocytes Absolute 11/02/2017 2 50     Monocytes Absolute 11/02/2017 0 60     Eosinophils Absolute 11/02/2017 0 13     Basophils Absolute 11/02/2017 0 03     Sodium 11/02/2017 138     Potassium 11/02/2017 3 6     Chloride 11/02/2017 101     CO2 11/02/2017 30     Anion Gap 11/02/2017 7     BUN 11/02/2017 15     Creatinine 11/02/2017 0 97     Glucose 11/02/2017 171*    Calcium 11/02/2017 8 8     eGFR 11/02/2017 55     Ventricular Rate 11/02/2017 83     Atrial Rate 11/02/2017 83     NM Interval 11/02/2017 168     QRSD Interval 11/02/2017 106     QT Interval 11/02/2017 414     QTC Interval 11/02/2017 486     P Axis 11/02/2017 61     QRS Axis 11/02/2017 -33     T Wave [place] 11/02/2017 116     Troponin I 11/02/2017 <0 02     Ventricular Rate 11/02/2017 81     Atrial Rate 11/02/2017 81     NM Interval 11/02/2017 168     QRSD Interval 11/02/2017 104     QT Interval 11/02/2017 384     QTC Interval 11/02/2017 446     P Axis 11/02/2017 63     QRS Axis 11/02/2017 -37     T Wave Axis 11/02/2017 82     Troponin I 11/02/2017 <0 02      Labs & Results:      Results from last 7 days  Lab Units 11/02/17  1351 11/02/17  1002   TROPONIN I ng/mL <0 02 <0 02       Results from last 7 days  Lab Units 11/02/17  1002   WBC Thousand/uL 7 38   HEMOGLOBIN g/dL 13 1   HEMATOCRIT % 40 7   PLATELETS Thousands/uL 191 Results from last 7 days  Lab Units 11/02/17  1002   SODIUM mmol/L 138   POTASSIUM mmol/L 3 6   CHLORIDE mmol/L 101   CO2 mmol/L 30   BUN mg/dL 15   CREATININE mg/dL 0 97   CALCIUM mg/dL 8 8   GLUCOSE RANDOM mg/dL 171*                 Imaging: I have personally reviewed pertinent reports  X-ray Chest 2 Views    Result Date: 11/2/2017  Narrative: CHEST - DUAL ENERGY INDICATION:  Chest pain  COMPARISON:  Chest radiograph September 15, 2014 VIEWS:  PA (including soft tissue/bone algorithms) and lateral projections IMAGES:  4 FINDINGS:     The heart is enlarged  Atherosclerotic changes in the aorta  The lungs are clear  No pneumothorax or pleural effusion  Postoperative changes in the left breast and left axilla  Age-appropriate degenerative changes are noted in the spine  Osseous structures appear otherwise within normal limits  Impression: No active pulmonary disease  Workstation performed: [id number]       EKG:  Normal sinus rhythm, left axis deviation with an upper normal QTC  No significant arrhythmias seen on telemetry review  Counseling / Coordination of Care  Total floor / unit time spent today 40 minutes  Greater than 50% of total time was spent with the patient and / or family counseling and / or coordination of care

## 2017-11-02 NOTE — CASE MANAGEMENT
Initial Clinical Review    Admission: Date/Time/Statement: 11/2/2017  1151 OBSERVATION    Orders Placed This Encounter   Procedures    Place in Observation (expected length of stay for this patient is less than two midnights)     Standing Status:   Standing     Number of Occurrences:   1     Order Specific Question:   Admitting Physician     Answer:   Steve Bearden [[id number]]     Order Specific Question:   Level of Care     Answer:   Med Surg [16]         ED: Date/Time/Mode of Arrival:   ED Arrival Information     Expected Arrival Acuity Means of Arrival Escorted By Service Admission Type    - 11/2/2017 09:35 Emergent Walk-In Self General Medicine Emergency    Arrival Complaint    chest pain          Chief Complaint:   Chief Complaint   Patient presents with    Chest Pain     Patient presents to the ER stating she started with left sided chest pain yesterday, went away then woke her up this am  presently denies any pain, N, V, or diaporesis or SOB  History of Illness: [de-identified] y o  female who presented to the emergency department with chest pain  Patient states that she woke up this morning at 5:00 a m  with chest pain which was left-sided in location, 4/10 in intensity, sharp, constant, nonradiating  Chest pain did not get worse with inspiration or movement  Patient states that she had similar episode on Monday which lasted few hours and then subsequently subsided  Patient had no associated nausea, diaphoresis, palpitations  Patient does complain of having dyspnea on exertion      ED Vital Signs:   ED Triage Vitals [11/02/17 0945]   Temperature Pulse Respirations Blood Pressure SpO2   98 3 °F (36 8 °C) 88 18 142/70 100 %      Temp Source Heart Rate Source Patient Position - Orthostatic VS BP Location FiO2 (%)   Tympanic Monitor Lying Right arm --      Pain Score       No Pain        Wt Readings from Last 1 Encounters:   11/02/17 79 1 kg (174 lb 6 1 oz)       Vital Signs (abnormal): none    Abnormal Labs/Diagnostic Test Results:   Glucose 171    Serial troponin - negative x 2  ED Treatment:   Medication Administration from 11/02/2017 0935 to 11/02/2017 1247     None          Past Medical/Surgical History: Active Ambulatory Problems     Diagnosis Date Noted    No Active Ambulatory Problems     Resolved Ambulatory Problems     Diagnosis Date Noted    No Resolved Ambulatory Problems     Past Medical History:   Diagnosis Date    [facility] 2005    Cataract     [facility] mellitus ([facility] )     Full dentures     Heart palpitations     Hyperlipidemia     Hypertension     Limb alert care status     Wears glasses        Admitting Diagnosis: Chest pain [R07 9]    Age/Sex: [de-identified] y o  female    Assessment/Plan: Admit to med surgical floor on telemetry as observation status  · Chest pain r/o ACS  Serial troponin, EKG and echocardiogram  Cardiology consult  Oxygen supplementation and bronchodilators p r n  Lipid profile in a m  Continue on aspirin, Toprol-XL, statin  · Hypertension, hyperlipidemia and diabetes mellitus  Accu-Chek a c  HS with Humalog sliding scale  Continue on Januvia  Continue on lozol, Toprol-XL, Lipitor  Continue on Requip and amitriptyline  · DVT prophylaxis  Discussed with patient in detail    Anticipated length of stay less than 2 midnights        Admission Orders:  TELE  11/2/2107  1151 OBSERVATION  Scheduled Meds:   amitriptyline 10 mg Oral HS   aspirin 81 mg Oral Daily   atorvastatin 40 mg Oral Daily With Dinner   heparin (porcine) 5,000 Units Subcutaneous Q8H [address]   indapamide 1 25 mg Oral Daily   insulin lispro 2-12 Units Subcutaneous TID AC   metoprolol succinate 25 mg Oral Daily   rOPINIRole 2 mg Oral HS   sitaGLIPtin 100 mg Oral Daily   cyanocobalamin 100 mcg Oral Daily     Continuous Infusions:    PRN Meds: not used:    acetaminophen    nitroglycerin    ondansetron    Insert peripheral IV **AND** sodium chloride (PF)     OTHER ORDERS:  Fingerstick glucose qid  scds  Consult cardiology  PT/OT  echo      [facility] in the [facility] by [employer] [employer]ello for 2017  Network Utilization Review Department  Phone: [phone]; Fax [phone]  ATTENTION: The Network Utilization Review Department is now centralized for our 7 Facilities  Make a note that we have a new phone and fax numbers for our Department  Please call with any questions or concerns to [phone] and carefully follow the prompts so that you are directed to the right person  All voicemails are confidential  Fax any determinations, approvals, denials, and requests for initial or continue stay review clinical to [phone]  Due to HIGH CALL volume, it would be easier if you could please send faxed requests to expedite your requests and in part, help us provide discharge notifications faster

## 2017-11-08 ENCOUNTER — ALLSCRIPTS OFFICE VISIT (OUTPATIENT)
Dept: OTHER | Facility: OTHER | Age: 82
End: 2017-11-08

## 2017-11-22 ENCOUNTER — GENERIC CONVERSION - ENCOUNTER (OUTPATIENT)
Dept: OTHER | Facility: OTHER | Age: 82
End: 2017-11-22

## 2017-11-22 ENCOUNTER — LAB CONVERSION - ENCOUNTER (OUTPATIENT)
Dept: OTHER | Facility: OTHER | Age: 82
End: 2017-11-22

## 2017-11-22 LAB
A/G RATIO (HISTORICAL): 1.2 (CALC) (ref 1–2.5)
ALBUMIN SERPL BCP-MCNC: 3.7 G/DL (ref 3.6–5.1)
ALP SERPL-CCNC: 59 U/L (ref 33–130)
ALT SERPL W P-5'-P-CCNC: 51 U/L (ref 6–29)
AST SERPL W P-5'-P-CCNC: 46 U/L (ref 10–35)
BILIRUB SERPL-MCNC: 0.6 MG/DL (ref 0.2–1.2)
BUN SERPL-MCNC: 19 MG/DL (ref 7–25)
BUN/CREA RATIO (HISTORICAL): 21 (CALC) (ref 6–22)
CALCIUM SERPL-MCNC: 9.2 MG/DL (ref 8.6–10.4)
CHLORIDE SERPL-SCNC: 98 MMOL/L (ref 98–110)
CHOLEST SERPL-MCNC: 131 MG/DL
CHOLEST/HDLC SERPL: 3.7 (CALC)
CO2 SERPL-SCNC: 31 MMOL/L (ref 20–31)
CREAT SERPL-MCNC: 0.9 MG/DL (ref 0.6–0.88)
EGFR AFRICAN AMERICAN (HISTORICAL): 70 ML/MIN/1.73M2
EGFR-AMERICAN CALC (HISTORICAL): 60 ML/MIN/1.73M2
GAMMA GLOBULIN (HISTORICAL): 3.1 G/DL (CALC) (ref 1.9–3.7)
GLUCOSE (HISTORICAL): 153 MG/DL (ref 65–99)
HBA1C MFR BLD HPLC: 7.6 % OF TOTAL HGB
HDLC SERPL-MCNC: 35 MG/DL
HEPATITIS A IGM ANTIBODY (HISTORICAL): NORMAL
HEPATITIS B CORE TOTAL ANTIBODY (HISTORICAL): NORMAL
HEPATITIS B SURFACE ANTIGEN (HISTORICAL): NORMAL
HEPATITIS C ANTIBODY (HISTORICAL): NORMAL
LDL CHOLESTEROL (HISTORICAL): 73 MG/DL (CALC)
NON-HDL-CHOL (CHOL-HDL) (HISTORICAL): 96 MG/DL (CALC)
POTASSIUM SERPL-SCNC: 3.7 MMOL/L (ref 3.5–5.3)
SIGNAL TO CUT-OFF (HISTORICAL): 0.03
SODIUM SERPL-SCNC: 137 MMOL/L (ref 135–146)
TOTAL PROTEIN (HISTORICAL): 6.8 G/DL (ref 6.1–8.1)
TRIGL SERPL-MCNC: 152 MG/DL

## 2017-11-28 ENCOUNTER — ALLSCRIPTS OFFICE VISIT (OUTPATIENT)
Dept: OTHER | Facility: OTHER | Age: 82
End: 2017-11-28

## 2017-11-29 NOTE — PROGRESS NOTES
Assessment  Assessed    1  PVC's (premature ventricular contractions) (427 69) (I49 3)   2  Hypertension (401 9) (I10)   3  Mixed hyperlipidemia (272 2) (E78 2)    Plan  Cellulitis of jaw, left    · Doxycycline Hyclate 100 MG Oral Capsule   Rx By: Taylor Rogers; Dispense: 15 Days ; #:30 Capsule; Refill: 1;For: Cellulitis of jaw, left; PINEDA = N; Sent To: [facility]; Last Updated By: Micha Peters; 11/28/2017 11:17:50 AM  Diabetes mellitus type II, controlled    · Januvia 100 MG Oral Tablet   Rx By: Taylor Rogers; Dispense: 90 Days ; #:90 Tablet; Refill: 1;For: Diabetes mellitus type II, controlled; PINEDA = N; Sent To: [facility]; Last Updated By: Micha Peters; 11/28/2017 11:17:50 AM   · Jardiance 25 MG Oral Tablet; Take 1 tablet daily   Rx By: Taylor Rogers; Dispense: 90 Days ; #:90 Tablet; Refill: 1;For: Diabetes mellitus type II, controlled; PINEDA = N; Verified Transmission to [facility]; Msg to Pharmacy: [facility]; Last Updated By: System, [employer]; 11/28/2017 11:18:55 AM  PVC's (premature ventricular contractions)    · Continue with our present treatment plan ; Status:Complete;   Done: [id number]   Ordered;(premature ventricular contractions); Ordered By:Niyah Boateng;   · Follow-up visit in 1 year Evaluation and Treatment  Follow-up  Status: Complete  Done:79Lgv8917   Ordered;PVC's (premature ventricular contractions); Ordered By: Andrew Andersen Performed:  Order Comments: PT ON WAIT LIST Due: 91PVN1068; Last Updated By: Nery Dejesus; 11/28/2017 11:36:51 AM    Discussion/Summary  Cardiology Discussion Summary Free Text Note Form [facility]:   Palpitations/PVCs - On an ECG and Holter monitor it is demonstrating that she's having occasional PVCs, that are symptomatic at times  However they are tolerable  No changes were made to her therapy  We will continue to follow yearly  As stated she was in the hospital recently for atypical chest pain that has now resolved  This appeared to be GI related  - Her blood pressure is doing better now that we've cut his metoprolol in half  No changes were made today  She should periodically follow her blood pressure at home  - She will continue the atorvastatin  She will continue to get blood work checked for her PCPs office  Counseling Documentation With Imm: The patient was counseled regarding diagnostic results,-- instructions for management,-- impressions  total time of encounter was 25 minutes  Chief Complaint  Chief Complaint Free Text Note Form: Hospital f/u      History of Present Illness  Cardiology HPI Free Text Note Form  [facility] [place]: Mrs Namita Morris comes in for follow-up given her history of palpitations and PVCs  Kirsty Lopez followed with me between 2010 - 2012  I initially saw her due to symptoms of lightheadedness and orthostasis  She was also having some atypical chest burning symptoms  I had her undergo a workup at that time  Her Holter and echo were both essentially normal  Her stress nuclear study demonstrated a reversible anterior defect, but was also affected by breast attenuation artifact  Regardless I had her undergo a cardiac catheterization that demonstrated no significant obstructive coronary artery disease  to 2 years ago she came back to see me because she was having some exertional dyspnea and continued with her chronic palpitations  At that time I had her undergo a workup again  Her stress nuclear, holter and echo  Her stress nuclear study was normal and her echocardiogram showed normal LV systolic function with mild LVH  Her Holter monitor demonstrated about 1500 PVCs in a 48 hour period  Her blood pressure was running a bit low and she was feeling better from a palpitation standpoint  Therefore we cut her metoprolol down to 25 mg daily  She has tolerated this nicely  Denisse Ruiz was just in the hospital for atypical chest pain  I saw her in consultation there  She did not appear to be describing angina   This seemed to be GI related, and she has since been on a PPI and her chest pain has gone away and not come back  She does get shortness of breath with upper levels of exertion which have not changed  She denies any other symptoms suggestive of angina  She still will get intermittent palpitations, particularly at night, but they are tolerable  No lightheadedness or syncope  She denies any symptoms of congestive heart failure  Hyperlipidemia (Follow-Up): The patient states her hyperlipidemia has been stable since the last visit  Comorbid Illnesses: diabetes mellitus-- and-- hypertension  She has no significant interval events  Symptoms: The patient is currently asymptomatic  Associated symptoms include no focal neurologic deficits-- and-- no memory loss  Medications: the patient is adherent with her medication regimen  -- She denies medication side effects  the patient's last LDL was 60 mg/dL  Review of Systems  Cardiology Female ROS:    Cardiac: palpitations present , but-- as noted in HPI  Skin: No complaints of nonhealing sores or skin rash  Genitourinary: No complaints of recurrent urinary tract infections, frequent urination at night, difficult urination, blood in urine, kidney stones, loss of bladder control, kidney problems, denies any birth control or hormone replacement, is not post menopausal, not currently pregnant  Psychological: No complaints of feeling depressed, anxiety, panic attacks, or difficulty concentrating  General: lack of energy/fatigue  Respiratory: No complaints of shortness of breath, cough with sputum, or wheezing  HEENT: serious eye problems, but-- as noted in HPI  Gastrointestinal: No complaints of liver problems, nausea, vomiting, heartburn, constipation, bloody stools, diarrhea, problems swallowing, adbominal pain, or rectal bleeding  Hematologic: No complaints of bleeding disorders, anemia, blood clots, or excessive brusing    Neurological: numbnes  Musculoskeletal: No complaints of arthritis, back pain, or painfull swelling  ROS Reviewed:   ROS reviewed  Active Problems  Problems    1  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)   2  History of Aftercare following joint replacement surgery (V54 81) (Z47 1)   3  Breast cancer (174 9) (C50 919)   4  Cataract, right (366 9) (H26 9)   5  Cellulitis of jaw, left (682 0) (L03 211)   6  Coagulation test abnormality (790 92) (R79 1)   7  Diabetes mellitus type II, controlled (250 00) (E11 9)   8  Diabetes mellitus with neuropathy (250 60,357 2) (E11 40)   9  Diabetic polyneuropathy associated with type 2 diabetes mellitus (250 60,357 2) (E11 42)   10  Dyspnea on exertion (786 09) (R06 09)   11  Elevated liver enzymes (790 5) (R74 8)   12  Encounter for screening mammogram for malignant neoplasm of breast (V76 12)  (Z12 31)   13  Gastroesophageal reflux disease (530 81) (K21 9)   14  Hordeolum externum of left eye (373 11) (H00 016)   15  Hypertension (401 9) (I10)   16  Irritable bowel syndrome with diarrhea (564 1) (K58 0)   17  Mixed dyslipidemia (272 2) (E78 2)   18  Mixed hyperlipidemia (272 2) (E78 2)   19  Palpitations (785 1) (R00 2)   20  Preoperative clearance (V72 84) (Z01 818)   21  PVC's (premature ventricular contractions) (427 69) (I49 3)   22  Restless leg syndrome (333 94) (G25 81)    Past Medical History  Problems    1  History of Abnormal findings on diagnostic imaging of breast (793 89) (R92 8)   2  History of Acquired breast deformity (611 89) (N64 89)   3  History of Aftercare following joint replacement surgery (V54 81) (Z47 1)   4  History of Aftercare involving the use of plastic surgery (V51 8) (Z09)   5  History of Aromatase inhibitor use (V07 52) (Z79 811)   6  History of Blood clot in vein (453 9) (I82 90)   7  History of Breast pain, right (611 71) (N64 4)   8  History of Coronary Artery Disease (V12 59)   9  History of Difficulty walking up stairs (719 7) (R26 2)   10   History of Encounter to discuss breast reconstruction (V65 49) (Z71 89)   11  H/O tamoxifen therapy (V67 51) (Z92 21)   12  History of breast lump (V13 89) (Z87 898)   13  History of diarrhea (V12 79) (Z87 898)   14  History of fibrocystic disease of breast (V13 89) (Z87 898)   15  History of hemorrhoids (V13 89) (Z87 19)   16  History of hypercholesterolemia (V12 29) (Z86 39)   17  History of knee replacement (V43 65) (Z96 659)   18  History of pregnancy (V13 29)   19  History of Hormone replacement therapy (V07 4) (Z79 890)   20  History of Menarche (V21 8)   21  Personal history of contraceptive use (V15 7) (Z92 0)   22  History of Radiation Therapy (V15 3)   23  History of Vision problem (V41 0) (H54 7)  Active Problems And Past Medical History Reviewed: The active problems and past medical history were reviewed and updated today  Surgical History  Problems    1  History of Breast Surgery Lumpectomy   2  History of Breast Surgery Mastectomy   3  History of Breast Surgery Reduction Procedure   4  History of Cholecystectomy   5  History of Hernia Repair   6  History of Hysterectomy   7  History of Knee Replacement   8  History of Reported Hx Of Breast Surgery For Biopsy   9  History of [place] Lymph Node Biopsy   10  History of Tonsillectomy   11  History of Tubal Ligation  Surgical History Reviewed: The surgical history was reviewed and updated today  Family History  Mother    1  No pertinent family history  Father    2  No pertinent family history  Paternal Aunt    1  Family history of malignant neoplasm of larynx (V16 2) (Z80 2)   4  Family history of malignant neoplasm of stomach (V16 0) (Z80 0)  Family History    5  Family history of cardiac disorder (V17 49) (Z82 49)   6  Family history of diabetes mellitus (V18 0) (Z83 3)  Family History Reviewed: The family history was reviewed and updated today         Social History  Problems    · Denied: History of Alcohol use   · Caffeine use (V49 89) (F15 90)   · Never A Smoker  Social History Reviewed: The social history was reviewed and updated today  The social history was reviewed and is unchanged  Current Meds   1  Amitriptyline HCl - 10 MG Oral Tablet; TAKE 1 TABLET AT BEDTIME; Therapy: 94KMN5018 to (Jad Kenney)  Requested for: 23JAX3939; Last Rx:03Nov2016 Ordered   2  Atorvastatin Calcium 40 MG Oral Tablet; Take 1 tablet daily; Therapy: 00DMW7316 to (Freddy Srinivasan)  Requested for: 26APP4276; Last Rx:10Oct2017; Status: ACTIVE - Retrospective By Protocol Authorization Ordered   3  Calcium TABS; Therapy: [phone]) to Recorded   4  CoQ10 200 MG Oral Capsule; Therapy: 79QBE8012 to Recorded   5  [employer] Vitamin B-12 2000 MCG Oral Tablet; Therapy: 05USS0567 to Recorded   6  Doxycycline Hyclate 100 MG Oral Capsule; TAKE 1 CAPSULE EVERY 12 HOURS DAILY; Therapy: 75TGX4590 to (Evaluate:54Xta5761)  Requested for: 05PNW3790; Last Rx:09Bvy9672 Ordered   7  Gabapentin 100 MG Oral Capsule; TAKE 1 CAPSULE 3 TIMES DAILY; Therapy: 86ESU4500 to (Evaluate:85Sri5092)  Requested for: 94BZE1377; Last Rx:12Oct2017; Status: ACTIVE - Retrospective By Protocol Authorization Ordered   8  Hyoscyamine Sulfate ER 0 375 MG Oral Tablet Extended Release 12 Hour; TAKE 1 TABLET EVERY 12 HOURS AS NEEDED; Therapy: 95WBP9270 to (Jad Kenney)  Requested for: 98NLB0192; Last Rx:03Nov2016 Ordered   9  Indapamide 1 25 MG Oral Tablet; Take 1 tablet daily; Therapy: 35AGS0910 to (Evaluate:42Vth4603)  Requested for: 10Aug2017; Last Rx:10Aug2017 Ordered   10  Januvia 100 MG Oral Tablet; TAKE 1 TABLET DAILY; Therapy: 24WTV7036 to (Evaluate:64Hwy8800)  Requested for: 56Zvi5062; Last  Rx:34Xjj2013 Ordered   11  Jardiance 25 MG Oral Tablet; Take 1 tablet daily; Therapy: 65Vcj7770 to (Evaluate:45Ugw3675)  Requested for: 22Nov2017; Last  Rx:22Nov2017 Ordered   12  Metoprolol Succinate ER 25 MG Oral Tablet Extended Release 24 Hour; Take 1 tablet  daily  Requested for: 28Apr2017; Last Rx:28Apr2017 Ordered   13  Multi Vitamin Daily Oral Tablet; Therapy: 91SYC2424 to Recorded   14  ROPINIRole HCl - 2 MG Oral Tablet; TAKE 1 TABLET AT BEDTIME; Therapy: 70WJY0131 to (Evaluate:12Vce3603)  Requested for: 10Aug2017; Last  Rx:10Aug2017 Ordered  Medication List Reviewed: The medication list was reviewed and updated today  Allergies  Medication    1  Hydrocodone-Acetaminophen TABS  Non-Medication    2  Adhesive Tape    Vitals  Vital Signs    Recorded: 83HSX9470 11:15AM   Heart Rate 80   Systolic 669   Diastolic 56   Weight 004 lb    BMI Calculated 30 08   BSA Calculated 1 87       Physical Exam   Constitutional  General appearance: No acute distress, well appearing and well nourished  Eyes  Conjunctiva and Sclera examination: Conjunctiva pink, sclera anicteric  Ears, Nose, Mouth, and Throat - Oropharynx: Clear, nares are clear, mucous membranes are moist   Neck  Neck and thyroid: Normal, supple, trachea midline, no thyromegaly  Pulmonary  Respiratory effort: No increased work of breathing or signs of respiratory distress  Auscultation of lungs: Clear to auscultation, no rales, no rhonchi, no wheezing, good air movement  Cardiovascular  Auscultation of heart: Normal rate and rhythm, normal S1 and S2, no murmurs  Carotid pulses: Normal, 2+ bilaterally  Peripheral vascular exam: Normal pulses throughout, no tenderness, erythema or swelling  Pedal pulses: Normal, 2+ bilaterally  Examination of extremities for edema and/or varicosities: Normal    Abdomen  Abdomen: Non-tender and no distention  Liver and spleen: No hepatomegaly or splenomegaly  Musculoskeletal Gait and station: Normal gait  -- Digits and nails: Normal without clubbing or cyanosis  -- Inspection/palpation of joints, bones, and muscles: Normal, ROM normal    Skin - Skin and subcutaneous tissue: Normal without rashes or lesions  Skin is warm and well perfused, normal turgor  Neurologic - Cranial nerves: II - XII intact  -- Speech: Normal  Psychiatric - Orientation to person, place, and time: Normal -- Mood and affect: Normal       Health Management  Diabetes mellitus type II, controlled   (1) HEMOGLOBIN A1C; every 3 months; Last 47EFA6647; Next Due: 08FSR6818; Overdue  *VB - Eye Exam; every 1 year; Last 57QTO9706; Next Due: 18YDA0953; Overdue  *VB - Foot Exam; every 1 year; Last 61SZH3957; Next Due: 12TZM8334; Active    Future Appointments    Date/Time Provider Specialty Site   09/20/2018 01:45 PM KATLYN Keene   Surgical Oncology [facility]       Signatures   Electronically signed by : KATLYN Chong ; Nov 28 2017 11:40AM EST                       (Author)

## 2018-01-10 NOTE — RESULT NOTES
Message   Recorded as Task   Date: 02/24/2017 12:36 PM, Created By: Bonny Ibarra   Task Name: Call Patient with results   Assigned To: Smith Hendrix   Regarding Patient: Yolanda Sandhu, Status: Active   CommentReketurah Malagon - 24 Feb 2017 12:36 PM     Patient Phone: [phone]    Sugars are still high should have appointment next 3 months to further discuss medication changes  Jody Diaz - 24 Feb 2017 1:43 PM     TASK EDITED  PT AWARE- NUMBERS PROVIDED-WILL CALL BACK TO SCHEDULE          Signatures   Electronically signed by : Rohith Cespedes, ; Feb 24 2017  1:43PM EST                       (Author)

## 2018-01-11 NOTE — RESULT NOTES
Verified Results  (1) COMPREHENSIVE METABOLIC PANEL 94QII7999 35:91QB Luis Fernando Don     Test Name Result Flag Reference   GLUCOSE 154 mg/dL H 65-99   Fasting reference interval     For someone without known diabetes, a glucose  value >125 mg/dL indicates that they may have  diabetes and this should be confirmed with a  follow-up test    UREA NITROGEN (BUN) 14 mg/dL  7-25   CREATININE 0 87 mg/dL  0 60-0 88   For patients >52years of age, the reference limit  for Creatinine is approximately 13% higher for people  identified as -American  eGFR NON-AFR  AMERICAN 63 mL/min/1 73m2  > OR = 60   eGFR AFRICAN AMERICAN 73 mL/min/1 73m2  > OR = 60   BUN/CREATININE RATIO   5-09   NOT APPLICABLE (calc)   SODIUM 137 mmol/L  135-146   POTASSIUM 3 9 mmol/L  3 5-5 3   CHLORIDE 100 mmol/L     CARBON DIOXIDE 31 mmol/L  20-31   CALCIUM 9 1 mg/dL  8 6-10 4   PROTEIN, TOTAL 6 8 g/dL  6 1-8 1   ALBUMIN 3 8 g/dL  3 6-5 1   GLOBULIN 3 0 g/dL (calc)  1 9-3 7   ALBUMIN/GLOBULIN RATIO 1 3 (calc)  1 0-2 5   BILIRUBIN, TOTAL 0 6 mg/dL  0 2-1 2   ALKALINE PHOSPHATASE 52 U/L     AST 77 U/L H 10-35    U/L H 6-29     (Q) HEMOGLOBIN A1c 43Gnn5036 10:12AM Luis Fernando Ochoa   REPORT COMMENT:  FASTING:YES     Test Name Result Flag Reference   HEMOGLOBIN A1c 8 0 % of total Hgb H <5 7   For someone without known diabetes, a hemoglobin A1c  value of 6 5% or greater indicates that they may have   diabetes and this should be confirmed with a follow-up   test      For someone with known diabetes, a value <7% indicates   that their diabetes is well controlled and a value   greater than or equal to 7% indicates suboptimal   control  A1c targets should be individualized based on   duration of diabetes, age, comorbid conditions, and   other considerations  Currently, no consensus exists regarding use of  hemoglobin A1c for diagnosis of diabetes for children

## 2018-01-12 VITALS
OXYGEN SATURATION: 97 % | BODY MASS INDEX: 29.66 KG/M2 | DIASTOLIC BLOOD PRESSURE: 68 MMHG | HEART RATE: 88 BPM | WEIGHT: 178 LBS | SYSTOLIC BLOOD PRESSURE: 110 MMHG | TEMPERATURE: 100 F | HEIGHT: 65 IN | RESPIRATION RATE: 14 BRPM

## 2018-01-12 VITALS
DIASTOLIC BLOOD PRESSURE: 82 MMHG | WEIGHT: 183 LBS | HEIGHT: 65 IN | HEART RATE: 86 BPM | SYSTOLIC BLOOD PRESSURE: 126 MMHG | BODY MASS INDEX: 30.49 KG/M2 | OXYGEN SATURATION: 96 %

## 2018-01-13 VITALS
WEIGHT: 178 LBS | DIASTOLIC BLOOD PRESSURE: 70 MMHG | HEIGHT: 65 IN | SYSTOLIC BLOOD PRESSURE: 120 MMHG | RESPIRATION RATE: 16 BRPM | HEART RATE: 92 BPM | BODY MASS INDEX: 29.66 KG/M2 | OXYGEN SATURATION: 97 %

## 2018-01-14 VITALS
HEART RATE: 80 BPM | DIASTOLIC BLOOD PRESSURE: 56 MMHG | SYSTOLIC BLOOD PRESSURE: 118 MMHG | BODY MASS INDEX: 30.08 KG/M2 | WEIGHT: 178 LBS

## 2018-01-14 VITALS
RESPIRATION RATE: 14 BRPM | HEIGHT: 65 IN | BODY MASS INDEX: 30.49 KG/M2 | SYSTOLIC BLOOD PRESSURE: 120 MMHG | WEIGHT: 183 LBS | DIASTOLIC BLOOD PRESSURE: 60 MMHG | HEART RATE: 97 BPM

## 2018-01-14 NOTE — RESULT NOTES
Verified Results  (Q) MICROALBUMIN, RANDOM URINE (W/CREATININE) 85YZH7287 09:53AM [employer]     Test Name Result Flag Reference   CREATININE, RANDOM URINE 95 mg/dL     MICROALBUMIN 0 3 mg/dL     Reference Range  Not established   MICROALBUMIN/CREATININE$RATIO, RANDOM URINE 3 mcg/mg creat  <30   The ADA defines abnormalities in albumin  excretion as follows:     Category         Result (mcg/mg creatinine)     Normal                    <30  Microalbuminuria            Clinical albuminuria   > OR = 300     The ADA recommends that at least two of three  specimens collected within a 3-6 month period be  abnormal before considering a patient to be  within a diagnostic category  (Q) LIPID PANEL WITH REFLEX TO DIRECT LDL [id number] 09:53AM [employer]     Test Name Result Flag Reference   CHOLESTEROL, TOTAL 126 mg/dL  125-200   HDL CHOLESTEROL 36 mg/dL L > OR = 46   TRIGLICERIDES 947 mg/dL H <150   LDL-CHOLESTEROL 51 mg/dL (calc)  <130   Desirable range <100 mg/dL for patients with CHD or  diabetes and <70 mg/dL for diabetic patients with  known heart disease  CHOL/HDLC RATIO 3 5 (calc)  < OR = 5 0   NON HDL CHOLESTEROL 90 mg/dL (calc)     Target for non-HDL cholesterol is 30 mg/dL higher than   LDL cholesterol target  (Q) COMPREHENSIVE METABOLIC PNL W/ADJUSTED CALCIUM 71UUE7705 09:53AM [employer]     Test Name Result Flag Reference   GLUCOSE 153 mg/dL H 65-99   Fasting reference interval   UREA NITROGEN (BUN) 18 mg/dL  7-25   CREATININE 0 94 mg/dL H 0 60-0 93   For patients >52years of age, the reference limit  for Creatinine is approximately 13% higher for people  identified as -American  eGFR NON-AFR   AMERICAN 58 mL/min/1 73m2 L > OR = 60   eGFR AFRICAN AMERICAN 67 mL/min/1 73m2  > OR = 60   BUN/CREATININE RATIO 19 (calc)  6-22   AST 29 U/L  10-35   ALT 41 U/L H 6-29   PROTEIN, TOTAL 7 2 g/dL  6 1-8 1   ALBUMIN 4 1 g/dL  3 6-5 1   GLOBULIN 3 1 g/dL (calc)  1 9-3 7   ALBUMIN/GLOBULIN RATIO 1 3 (calc)  1 0-2 5   BILIRUBIN, TOTAL 0 6 mg/dL  0 2-1 2   ALKALINE PHOSPHATASE 61 U/L     SODIUM 140 mmol/L  135-146   POTASSIUM 4 2 mmol/L  3 5-5 3   CHLORIDE 102 mmol/L     CARBON DIOXIDE 29 mmol/L  20-31   CALCIUM 9 3 mg/dL  8 6-10 4   CALCIUM (ADJUSTED FOR$ALBUMIN) 9 5 mg/dL (calc)  8 6-10 2     (Q) HEMOGLOBIN A1c 46Arw1999 09:53AM Chris Jcarlos   REPORT COMMENT:  FASTING:YES     Test Name Result Flag Reference   HEMOGLOBIN A1c 7 6 % of total Hgb H <5 7   According to ADA guidelines, hemoglobin A1c <7 0%  represents optimal control in non-pregnant diabetic  patients  Different metrics may apply to specific  patient populations  Standards of Medical Care in  [phone]  Diabetes Care  2013;36:s11-s66     For the purpose of screening for the presence of  diabetes  <5 7%       Consistent with the absence of diabetes  5 7-6 4%    Consistent with increased risk for diabetes              (prediabetes)  >or=6 5%    Consistent with diabetes     This assay result is consistent with diabetes  mellitus  Currently, no consensus exists for use of hemoglobin  A1c for diagnosis of diabetes for children

## 2018-01-15 NOTE — PROGRESS NOTES
Assessment    1  Encounter for preventive health examination (V70 0) (Z00 00)    Plan  Diabetes mellitus type II, controlled    · (1) COMPREHENSIVE METABOLIC PANEL; Status:Active; Requested for:20Nov2017;    · (1) HEMOGLOBIN A1C; Status:Active; Requested for:20Nov2017;    · (1) LIPID PANEL FASTING W DIRECT LDL REFLEX; Status:Active; Requested  for:20Nov2017;    · (1) MICROALBUMIN CREATININE RATIO, RANDOM URINE; Status:Active; Requested  for:20Nov2017;    · *VB - Foot Exam; Status:Complete;   Done: 30QZH3595 12:00AM   · *VB - Foot Exam ; every 1 year; Last 87VPF2545; Next 50JOT5177; Status:Active  Elevated liver enzymes    · (1) CHRONIC HEPATITIS PANEL; Status:Active; Requested for:20Nov2017;     Discussion/Summary  health maintenance visit Currently, she eats a healthy diet and has an adequate exercise regimen  cervical cancer screening is not indicated Breast cancer screening: mammogram is current  Colorectal cancer screening: colorectal cancer screening is current  Screening lab work includes up to date  The immunizations are up to date  She was advised to be evaluated by an ophthalmologist  Advice and education were given regarding nutrition, weight bearing exercise, weight loss, sunscreen use and seat belt use  The treatment plan was reviewed with the patient/guardian  The patient/guardian understands and agrees with the treatment plan      Chief Complaint  annual     Advance Directives  Advance Directive [facility]:   NO - Patient does not have an advance health care directive  History of Present Illness  HM, Adult Female: The patient is being seen for a health maintenance evaluation  Social History: She is unmarried  Work status: occupation: RETIRED  The patient has never smoked cigarettes  She reports never drinking alcohol  General Health: The patient's health since the last visit is described as good  She does not have regular dental visits  She denies vision problems  She has hearing loss  Immunizations status: up to date  Reproductive health: the patient is postmenopausal    Screening:      Review of Systems    Constitutional: no fever, not feeling poorly, no chills and not feeling tired  Cardiovascular: the heart rate was not slow, no chest pain, the heart rate was not fast and no palpitations  Respiratory: no shortness of breath, no cough, no wheezing and no shortness of breath during exertion  Gastrointestinal: no abdominal pain, no nausea, no constipation and no diarrhea  Integumentary: no rashes, no itching, no skin lesions and no skin wound  Active Problems    1  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)   2  History of Aftercare following joint replacement surgery (V54 81) (Z47 1)   3  Breast cancer (174 9) (C50 919)   4  Cataract, right (366 9) (H26 9)   5  Cellulitis of jaw, left (682 0) (L03 211)   6  Coagulation test abnormality (790 92) (R79 1)   7  Diabetes mellitus type II, controlled (250 00) (E11 9)   8  Diabetes mellitus with neuropathy (250 60,357 2) (E11 40)   9  Diabetic polyneuropathy associated with type 2 diabetes mellitus (250 60,357 2)   (E11 42)   10  Dyspnea on exertion (786 09) (R06 09)   11  Encounter for screening mammogram for malignant neoplasm of breast (V76 12)    (Z12 31)   12  Hordeolum externum of left eye (373 11) (H00 016)   13  Hypertension (401 9) (I10)   14  Irritable bowel syndrome with diarrhea (564 1) (K58 0)   15  Mixed dyslipidemia (272 2) (E78 2)   16  Mixed hyperlipidemia (272 2) (E78 2)   17  Palpitations (785 1) (R00 2)   18  Preoperative clearance (V72 84) (Z01 818)   19  PVC's (premature ventricular contractions) (427 69) (I49 3)   20   Restless leg syndrome (333 94) (G25 81)    Past Medical History    · History of Abnormal findings on diagnostic imaging of breast (793 89) (R92 8)   · History of Acquired breast deformity (611 89) (N64 89)   · History of Aftercare following joint replacement surgery (V54 81) (Z47 1)   · History of Aftercare involving the use of plastic surgery (V51 8) (Z09)   · History of Aromatase inhibitor use (V07 52) (Z79 811)   · History of Blood clot in vein (453 9) (I82 90)   · History of Breast pain, right (611 71) (N64 4)   · History of Coronary Artery Disease (V12 59)   · History of Difficulty walking up stairs (719 7) (R26 2)   · History of Encounter to discuss breast reconstruction (V65 49) (Z71 89)   · H/O tamoxifen therapy (V67 51) (Z92 21)   · History of breast lump (V13 89) (Q26 238)   · History of diarrhea (V12 79) (Y89 786)   · History of fibrocystic disease of breast (V13 89) (W88 072)   · History of hemorrhoids (V13 89) (Z87 19)   · History of hypercholesterolemia (V12 29) (Z86 39)   · History of knee replacement (V43 65) (Z96 659)   · History of pregnancy (V13 29)   · History of Hormone replacement therapy (V07 4) (Z79 890)   · History of Menarche (V21 8)   · Personal history of contraceptive use (V15 7) (Z92 0)   · History of Radiation Therapy (V15 3)   · History of Vision problem (V41 0) (H54 7)    Surgical History    · History of Breast Surgery Lumpectomy   · History of Breast Surgery Mastectomy   · History of Breast Surgery Reduction Procedure   · History of Cholecystectomy   · History of Hernia Repair   · History of Hysterectomy   · History of Knee Replacement   · History of Reported Hx Of Breast Surgery For Biopsy   · History of [place]va Lymph Node Biopsy   · History of Tonsillectomy   · History of Tubal Ligation    Family History  Mother    · No pertinent family history  Father    · No pertinent family history  Paternal Aunt    · Family history of malignant neoplasm of larynx (V16 2) (Z80 2)   · Family history of malignant neoplasm of stomach (V16 0) (Z80 0)  Family History    · Family history of cardiac disorder (V17 49) (Z82 49)   · Family history of diabetes mellitus (V18 0) (Z83 3)    Social History    · Denied: History of Alcohol use   · Caffeine use (V49 89) (F15 90)   · Never A Smoker    Current Meds   1  Amitriptyline HCl - 10 MG Oral Tablet; TAKE 1 TABLET AT BEDTIME; Therapy: 26UVC0587 to (Bindu Torres)  Requested for: 18PUK8932; Last   Rx:03Nov2016 Ordered   2  Atorvastatin Calcium 40 MG Oral Tablet; Take 1 tablet daily; Therapy: 18PKR1615 to (Murray Nails)  Requested for: 12DQU1064; Last   Rx:10Oct2017; Status: ACTIVE - Retrospective By Protocol Authorization Ordered   3  Calcium TABS; Therapy: ([phone]) to Recorded   4  CoQ10 200 MG Oral Capsule; Therapy: 62UJY8882 to Recorded   5  [facility] Vitamin B-12 2000 MCG Oral Tablet; Therapy: 55YTD4540 to Recorded   6  Doxycycline Hyclate 100 MG Oral Capsule; TAKE 1 CAPSULE EVERY 12 HOURS   DAILY; Therapy: 80TIK6233 to (Evaluate:39Wyd9512)  Requested for: 05CUF8300; Last   Rx:92Cox0032 Ordered   7  Gabapentin 100 MG Oral Capsule; TAKE 1 CAPSULE 3 TIMES DAILY; Therapy: 25SDV7862 to (Evaluate:61Dfy5581)  Requested for: 54VIS2445; Last   Rx:12Oct2017; Status: ACTIVE - Retrospective By Protocol Authorization Ordered   8  Hyoscyamine Sulfate ER 0 375 MG Oral Tablet Extended Release 12 Hour; TAKE 1   TABLET EVERY 12 HOURS AS NEEDED; Therapy: 69BMF7452 to (Bindu Torres)  Requested for: 72IRO1428; Last   Rx:03Nov2016 Ordered   9  Indapamide 1 25 MG Oral Tablet; Take 1 tablet daily; Therapy: 18PRF8258 to (Evaluate:41Vcl3352)  Requested for: 10Aug2017; Last   Rx:10Aug2017 Ordered   10  Januvia 100 MG Oral Tablet; TAKE 1 TABLET DAILY; Therapy: 52CPA5282 to (Evaluate:46Lmi4813)  Requested for: 74Bgd3651; Last    Rx:17Pmt5983 Ordered   11  Jardiance 10 MG Oral Tablet; Take 1 tablet daily; Therapy: 81Rgw2192 to (Evaluate:99Aex9702)  Requested for: 03Pdj6462; Last    Rx:56Pll3482 Ordered   12  Metoprolol Succinate ER 25 MG Oral Tablet Extended Release 24 Hour; Take 1 tablet    daily  Requested for: 33Pjm9867; Last Rx:28Apr2017 Ordered   13  Multi Vitamin Daily Oral Tablet;     Therapy: 84DUA3847 to Recorded 14  ROPINIRole HCl - 2 MG Oral Tablet; TAKE 1 TABLET AT BEDTIME; Therapy: 59YAY9046 to (Evaluate:51Fhg6253)  Requested for: 10Aug2017; Last    Rx:10Aug2017 Ordered    Allergies    1  Hydrocodone-Acetaminophen TABS    2  Adhesive Tape    Vitals   Recorded: [id number] 01:14PM   Heart Rate 92   Respiration 16   Systolic 740   Diastolic 70   Height 5 ft 4 5 in   Weight 178 lb    BMI Calculated 30 08   BSA Calculated 1 87   O2 Saturation 97     Physical Exam    Constitutional   General appearance: No acute distress, well appearing and well nourished  Neck   Neck: Supple, symmetric, trachea midline, no masses  Thyroid: Normal, no thyromegaly  Pulmonary   Respiratory effort: No increased work of breathing or signs of respiratory distress  Auscultation of lungs: Clear to auscultation  Cardiovascular   Auscultation of heart: Normal rate and rhythm, normal S1 and S2, no murmurs  Examination of extremities for edema and/or varicosities: Normal     Lymphatic   Palpation of lymph nodes in neck: No lymphadenopathy  Musculoskeletal   Gait and station: Normal     Digits and nails: Normal without clubbing or cyanosis  Joints, bones, and muscles: Normal     Skin   Skin and subcutaneous tissue: Normal without rashes or lesions  Psychiatric   Judgment and insight: Normal     Orientation to person, place, and time: Normal     Recent and remote memory: Intact  Mood and affect: Normal        Results/Data  PHQ-2 Adult Depression Screening 56BDO3078 03:14PM Trinidad Valdez     Test Name Result Flag Reference   PHQ-2 Adult Depression Score 2     Over the last two weeks, how often have you been bothered by any of the following problems?   Little interest or pleasure in doing things: Several days - 1  Feeling down, depressed, or hopeless: Several days - 1   PHQ-2 Adult Depression Screening Negative       *VB - Foot Exam 71AAH9296 12:00AM Kalyan Alba     Test Name Result Flag Reference   FOOT EXAM 71KKS0589 Health Management  Diabetes mellitus type II, controlled   (1) HEMOGLOBIN A1C; every 3 months; Last 40Rwc8364; Next Due: 13QDW7667; Overdue  *VB - Eye Exam; every 1 year; Last 06GZR5742; Next Due: 00VTV0181; Overdue  *VB - Foot Exam; every 1 year; Last 23GYE6736; Next Due: 90ZCM9754; Active    Future Appointments    Date/Time Provider Specialty Site   11/28/2017 11:20 AM KATLYN Wadsworth  Cardiology [facility] CARDIOLOGY [facility]   09/20/2018 01:45 PM KATLYN Arguello   Surgical Oncology [address]     Signatures   Electronically signed by : Jasper Sandhoff, MD; Nov 8 2017  4:50PM EST                       (Author)

## 2018-01-16 NOTE — RESULT NOTES
Verified Results  (1) COMPREHENSIVE METABOLIC PANEL 56RCV0023 13:49RO Micha Formica     Test Name Result Flag Reference   GLUCOSE 153 mg/dL H 65-99   Fasting reference interval     For someone without known diabetes, a glucose  value >125 mg/dL indicates that they may have  diabetes and this should be confirmed with a  follow-up test    UREA NITROGEN (BUN) 19 mg/dL  7-25   CREATININE 0 90 mg/dL H 0 60-0 88   For patients >52years of age, the reference limit  for Creatinine is approximately 13% higher for people  identified as -American  eGFR NON-AFR  AMERICAN 60 mL/min/1 73m2  > OR = 60   eGFR AFRICAN AMERICAN 70 mL/min/1 73m2  > OR = 60   BUN/CREATININE RATIO 21 (calc)  6-22   SODIUM 137 mmol/L  135-146   POTASSIUM 3 7 mmol/L  3 5-5 3   CHLORIDE 98 mmol/L     CARBON DIOXIDE 31 mmol/L  20-31   CALCIUM 9 2 mg/dL  8 6-10 4   PROTEIN, TOTAL 6 8 g/dL  6 1-8 1   ALBUMIN 3 7 g/dL  3 6-5 1   GLOBULIN 3 1 g/dL (calc)  1 9-3 7   ALBUMIN/GLOBULIN RATIO 1 2 (calc)  1 0-2 5   BILIRUBIN, TOTAL 0 6 mg/dL  0 2-1 2   ALKALINE PHOSPHATASE 59 U/L     AST 46 U/L H 10-35   ALT 51 U/L H 6-29     (Q) LIPID PANEL WITH REFLEX TO DIRECT LDL 17IXX4709 09:10AM Micha Formica     Test Name Result Flag Reference   CHOLESTEROL, TOTAL 131 mg/dL  <200   HDL CHOLESTEROL 35 mg/dL L >45   TRIGLICERIDES 794 mg/dL H <150   LDL-CHOLESTEROL 73 mg/dL (calc)     Reference range: <100     Desirable range <100 mg/dL for patients with CHD or  diabetes and <70 mg/dL for diabetic patients with  known heart disease  LDL-C is now calculated using the Poncho-Armstrong   calculation, which is a validated novel method providing   better accuracy than the Friedewald equation in the   estimation of LDL-C  Darrick Goldberg et al  [facility]  1005;279(18): 9743-6963   (http://[employer]/faq/VNI429)   CHOL/HDLC RATIO 3 7 (calc)  <5 0   NON HDL CHOLESTEROL 96 mg/dL (calc)  <130   For patients with diabetes plus 1 major ASCVD risk   factor, treating to a non-HDL-C goal of <100 mg/dL   (LDL-C of <70 mg/dL) is considered a therapeutic   option  (Q) HEPATITIS PANEL, ACUTE W/REFLEX TO CONFIRMATION 50TDA7883 09:10AM Monica Barajas     Test Name Result Flag Reference   HEPATITIS A IGM NON-REACTIVE  NON-REACTIVE   HEPATITIS B SURFACE ANTIGEN NON-REACTIVE  NON-REACTIVE   HEPATITIS B CORE$ANTIBODY (IGM) NON-REACTIVE  NON-REACTIVE   HEPATITIS C ANTIBODY NON-REACTIVE  NON-REACTIVE   SIGNAL TO CUT-OFF 0 03  <1 00     (Q) HEMOGLOBIN A1c 74QYU7576 09:10AM Monica Barajas   REPORT COMMENT:  FASTING:YES  PATIENT UNABLE TO VOID; ADVISED TO RETURN FOR COLLECTION  Test Name Result Flag Reference   HEMOGLOBIN A1c 7 6 % of total Hgb H <5 7   For someone without known diabetes, a hemoglobin A1c  value of 6 5% or greater indicates that they may have   diabetes and this should be confirmed with a follow-up   test      For someone with known diabetes, a value <7% indicates   that their diabetes is well controlled and a value   greater than or equal to 7% indicates suboptimal   control  A1c targets should be individualized based on   duration of diabetes, age, comorbid conditions, and   other considerations  Currently, no consensus exists regarding use of  hemoglobin A1c for diagnosis of diabetes for children

## 2018-01-17 NOTE — RESULT NOTES
Verified Results  (1) COMPREHENSIVE METABOLIC PANEL 97OMI0375 63:27GI [facility]     Test Name Result Flag Reference   GLUCOSE 122 mg/dL H 65-99   Fasting reference interval   UREA NITROGEN (BUN) 21 mg/dL  7-25   CREATININE 0 85 mg/dL  0 60-0 93   For patients >52years of age, the reference limit  for Creatinine is approximately 13% higher for people  identified as -American  eGFR NON-AFR  AMERICAN 65 mL/min/1 73m2  > OR = 60   eGFR AFRICAN AMERICAN 76 mL/min/1 73m2  > OR = 60   BUN/CREATININE RATIO   8-52   NOT APPLICABLE (calc)   SODIUM 138 mmol/L  135-146   POTASSIUM 3 6 mmol/L  3 5-5 3   CHLORIDE 101 mmol/L     CARBON DIOXIDE 27 mmol/L  19-30   CALCIUM 8 8 mg/dL  8 6-10 4   PROTEIN, TOTAL 6 7 g/dL  6 1-8 1   ALBUMIN 3 9 g/dL  3 6-5 1   GLOBULIN 2 8 g/dL (calc)  1 9-3 7   ALBUMIN/GLOBULIN RATIO 1 4 (calc)  1 0-2 5   BILIRUBIN, TOTAL 0 6 mg/dL  0 2-1 2   ALKALINE PHOSPHATASE 48 U/L     AST 26 U/L  10-35   ALT 28 U/L  6-29     (1) LIPID PANEL, FASTING 28WTJ3750 09:25AM [facility]     Test Name Result Flag Reference   CHOLESTEROL, TOTAL 119 mg/dL L 125-200   HDL CHOLESTEROL 36 mg/dL L > OR = 46   TRIGLICERIDES 544 mg/dL H <150   LDL-CHOLESTEROL 45 mg/dL (calc)  <130   Desirable range <100 mg/dL for patients with CHD or  diabetes and <70 mg/dL for diabetic patients with  known heart disease  CHOL/HDLC RATIO 3 3 (calc)  < OR = 5 0   NON HDL CHOLESTEROL 83 mg/dL (calc)     Target for non-HDL cholesterol is 30 mg/dL higher than   LDL cholesterol target       (Q) MICROALBUMIN, RANDOM URINE (W/CREATININE) 99UKY5030 09:25AM [facility]     Test Name Result Flag Reference   CREATININE, RANDOM URINE 78 mg/dL     MICROALBUMIN 0 4 mg/dL     Reference Range  Not established   MICROALBUMIN/CREATININE$RATIO, RANDOM URINE 5 mcg/mg creat  <30   The ADA defines abnormalities in albumin  excretion as follows:     Category         Result (mcg/mg creatinine)     Normal <30  Microalbuminuria            Clinical albuminuria   > OR = 300     The ADA recommends that at least two of three  specimens collected within a 3-6 month period be  abnormal before considering a patient to be  within a diagnostic category  (Q) HEMOGLOBIN A1c 10VXD3345 09:25AM Leopoldo Loan   REPORT COMMENT:  FASTING:YES     Test Name Result Flag Reference   HEMOGLOBIN A1c 7 5 % of total Hgb H <5 7   According to ADA guidelines, hemoglobin A1c <7 0%  represents optimal control in non-pregnant diabetic  patients  Different metrics may apply to specific  patient populations  Standards of Medical Care in    Diabetes Care  2013;36:s11-s66     For the purpose of screening for the presence of  diabetes  <5 7%       Consistent with the absence of diabetes  5 7-6 4%    Consistent with increased risk for diabetes              (prediabetes)  >or=6 5%    Consistent with diabetes     This assay result is consistent with diabetes  mellitus  Currently, no consensus exists for use of hemoglobin  A1c for diagnosis of diabetes for children

## 2018-01-18 NOTE — MISCELLANEOUS
Message   Recorded as Task   Date: 11/29/2016 11:38 AM, Created By: Sandeep Shah   Task Name: Call Back   Assigned To: Gary Dia   Regarding Patient: Rhonda Baker, Status: In Progress   Comment:    Nanci Chirinos - 29 Nov 2016 11:38 AM     TASK CREATED  Caller: Self; [phone] (Home)  LYRICA IS TO MUCH MONEY CAN SHE GO GABAPENTIN       [facility]   Giancarlo Childs 90 DAY   Giancarlo Bakero PLEASE CALL PATIENT   Nanci Chirinos - 30 Nov 2016 9:01 AM     TASK EDITED   Twylla Carry - 30 Nov 2016 9:05 AM     TASK REASSIGNED: Previously Assigned To [address]  pt would like to go back to gabapentin due to high cost of lyrica--Efe Patel - 30 Nov 2016 11:05 AM     TASK EDITED  ok--ck dose w/ pt   Isabel Alvarez - 02 Dec 2016 1:02 PM     TASK REASSIGNED: Previously Assigned To Oleksandr Foster TO CALL TO VERIFY LAST DOSE TAKEN AND DIRECTIONS--ROYAL Dia - 02 Dec 2016 1:24 PM     TASK EDITED  Is taking Lyrica 75mg TID--- needs transition to Gabapentin   Jody Diaz - 05 Dec 2016 7:15 AM     TASK REASSIGNED: Previously Assigned To Avery Sahu - 05 Dec 2016 11:55 AM     TASK EDITED  go back to prev dose   Isabel Alvarez - 05 Dec 2016 2:00 PM     TASK REASSIGNED: Previously Assigned To [facility] CLINICAL,TEAM  PT STATED NEVER TOOK GABAPENTIN BEFORE--OK TO START 100MG TID? --Mirta Peterson - 05 Dec 2016 3:06 PM     TASK REPLIED TO: Previously Assigned To Smith Hendrix  start 100mg daily X 3 days, then BID X 3 days, then TID- then give progress report or F/U OV to see if needs a [employer] - 05 Dec 2016 3:14 PM     TASK EDITED  Msg left for pt to cb  RX for Gabapentin sent to [facility]  Explain to pt how to use Gabapentin  Gary Nails - 07 Dec 2016 1:35 PM     TASK EDITED  2nd attempt to contact pt- left message H#   Gary Nails - 07 Dec 2016 2:49 PM     TASK EDITED  PT aware  Active Problems    1   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)   2  History of Aftercare following joint replacement surgery (V54 81) (Z47 1)   3  Breast cancer (174 9) (C50 919)   4  Cataract, right (366 9) (H26 9)   5  Coagulation test abnormality (790 92) (R79 1)   6  Diabetes mellitus type II, controlled (250 00) (E11 9)   7  Diabetes mellitus with neuropathy (250 60,357 2) (E11 40)   8  Diabetic polyneuropathy associated with type 2 diabetes mellitus (250 60,357 2)   (E11 42)   9  Dyspnea on exertion (786 09) (R06 09)   10  Encounter for screening mammogram for malignant neoplasm of breast (V76 12)    (Z12 31)   11  Hordeolum externum of left eye (373 11) (H00 016)   12  Hypertension (401 9) (I10)   13  Irritable bowel syndrome with diarrhea (564 1) (K58 0)   14  Mixed dyslipidemia (272 2) (E78 2)   15  Mixed hyperlipidemia (272 2) (E78 2)   16  Palpitations (785 1) (R00 2)   17  Preoperative clearance (V72 84) (Z01 818)   18  PVC's (premature ventricular contractions) (427 69) (I49 3)   19  Restless leg syndrome (333 94) (G25 81)    Current Meds   1  Amitriptyline HCl - 10 MG Oral Tablet; TAKE 1 TABLET AT BEDTIME; Therapy: 06BKD3241 to (Harlan Jalloh)  Requested for: 21ZBV4362; Last   Rx:03Nov2016 Ordered   2  Atorvastatin Calcium 40 MG Oral Tablet; take 1 tablet by mouth every day; Therapy: 72ORB3072 to (Harlan Jalloh)  Requested for: 53CBF6048; Last   Rx:03Nov2016 Ordered   3  Calcium TABS; Therapy: (51 196 19 99) to Recorded   4  CoQ10 200 MG Oral Capsule; Therapy: 48ZMT6534 to Recorded   5  [facility]S Vitamin B-12 2000 MCG Oral Tablet; Therapy: 21SCG3561 to Recorded   6  Gabapentin 100 MG Oral Capsule (Neurontin); Take 1 capsule HS X 3 days, then 1   capsule BID X 3 days, then 1 capsule TID and stay on this dose; Therapy: 20MZU7870 to (Evaluate:03Jun2017)  Requested for: 00SDV4931; Last   Rx:03Mgo0211 Ordered   7   Hyoscyamine Sulfate ER 0 375 MG Oral Tablet Extended Release 12 Hour; TAKE 1   TABLET EVERY 12 HOURS AS NEEDED; Therapy: 44BSX7279 to ([facility])  Requested for: 86CSM0068; Last   Rx:03Nov2016 Ordered   8  Indapamide 1 25 MG Oral Tablet; Take 1 tablet daily  Requested for: 60RBS8476; Last   Rx:03Nov2016 Ordered   9  Januvia 100 MG Oral Tablet; TAKE 1 TABLET DAILY; Therapy: 96DXV6913 to (Evaluate:06Dzp5583)  Requested for: 75AVM6218; Last   Rx:03Nov2016 Ordered   10  Metoprolol Succinate ER 25 MG Oral Tablet Extended Release 24 Hour; Take 1 tablet    daily  Requested for: 34FYA9637; Last Rx:24Oct2016 Ordered   11  Multi Vitamin Daily Oral Tablet; Therapy: 76BEQ1037 to Recorded   12  ROPINIRole HCl - 2 MG Oral Tablet; TAKE 1 TABLET AT BEDTIME; Therapy: 28PKC6550 to ([facility])  Requested for: 50XSA0170; Last    Rx:03Nov2016 Ordered    Allergies    1  Hydrocodone-Acetaminophen TABS    2   Adhesive Tape    Signatures   Electronically signed by : Jessika Clarke, ; Dec  7 2016  2:49PM EST                       (Author)

## 2018-02-20 DIAGNOSIS — E08.00 DIABETES MELLITUS DUE TO UNDERLYING CONDITION WITH HYPEROSMOLARITY WITHOUT COMA, WITHOUT LONG-TERM CURRENT USE OF INSULIN (HCC): Primary | ICD-10-CM

## 2018-02-20 RX ORDER — SITAGLIPTIN 100 MG/1
TABLET, FILM COATED ORAL
Qty: 30 TABLET | Refills: 2 | Status: SHIPPED | OUTPATIENT
Start: 2018-02-20

## 2018-03-19 DIAGNOSIS — E11.9 TYPE 2 DIABETES MELLITUS WITHOUT COMPLICATION, UNSPECIFIED LONG TERM INSULIN USE STATUS: Primary | ICD-10-CM

## 2018-03-28 DIAGNOSIS — E11.42 DIABETIC POLYNEUROPATHY ASSOCIATED WITH TYPE 2 DIABETES MELLITUS (HCC): ICD-10-CM

## 2018-03-28 DIAGNOSIS — E78.2 MIXED DYSLIPIDEMIA: Primary | ICD-10-CM

## 2018-03-28 RX ORDER — GABAPENTIN 100 MG/1
CAPSULE ORAL
Qty: 270 CAPSULE | Refills: 0 | Status: SHIPPED | OUTPATIENT
Start: 2018-03-28 | End: 2018-07-31 | Stop reason: SDUPTHER

## 2018-03-28 RX ORDER — ATORVASTATIN CALCIUM 40 MG/1
TABLET, FILM COATED ORAL
Qty: 90 TABLET | Refills: 0 | Status: SHIPPED | OUTPATIENT
Start: 2018-03-28 | End: 2018-08-27 | Stop reason: SDUPTHER

## 2018-03-29 DIAGNOSIS — E11.9 TYPE 2 DIABETES MELLITUS WITHOUT COMPLICATION, WITHOUT LONG-TERM CURRENT USE OF INSULIN (HCC): Primary | ICD-10-CM

## 2018-03-30 NOTE — TELEPHONE ENCOUNTER
PATIENT STATES THAT SHE IS GOING TO SEND BACK THE INVOKANA ONCE IT COMES- STATES SHE TALKED TO JUAN M MEDINA(A HEALTH INS REP) THAT IS HELPING HER WITH THIS PROCESS  SHE STATES THAT SHE NEEDS THE PATIENT ASSISTANT FORMS FILLED OUT(STATES THAT JUAN M MEDINA HELPED HER FILL MOST OF THE FORMS OUT AND THAT SHE DROPPED THEM OFF HERE SO WE CAN FILL OUT THE REST) AND FAXED FOR THE JARDIANCE SO SHE CAN GET THE MEDICATION  SHE IS CURRENTLY TAKING SAMPLES  SHE SAYS SHE WAS QY'X OFMGBGTN TEMPORARILY BECAUSE IT WAS SHE WAS TRYING TO FIGURE OUT HOW TO GET [address]

## 2018-04-04 NOTE — TELEPHONE ENCOUNTER
PM -- please sign Rx to be printed to mail along with Patient Assistance application        msg for patient to call office  Need to make her aware that the pharmacy will most likely NOT take the Invokana back as she told [facility] that when she receives it she is sending it back      We will submit the Jardiance Patient Assistance forms for her     +++ spoke to patient advised as above, she states that she spoke to the Mail order company and they are sending her a return  to send the [address] [place] [address] back+++

## 2018-04-13 NOTE — TELEPHONE ENCOUNTER
[facility] -- In case patient calls when I'm not in office      Patient brought Patient Assistance forms to the office for Rosa Isela Quintero  Upon review I saw where on 3/22/18 Invokana was sent to [address] [place] [address] and patient was instructed by Tracey Burk to discontinue the Jardiance when she starts the [address] [place] [address] [employer]  I verified with [facility] that there was no Prior Auth requested from Costa alexander  Message left for pt to call office, since the [address] [place] [address] [employer] was ordered she should not need the Jardiance    Verify with her what her expectations are       Loy Herndon all in F/U Needed folder on my desk^^^^^ General

## 2018-05-17 ENCOUNTER — OFFICE VISIT (OUTPATIENT)
Dept: CARDIOLOGY CLINIC | Facility: CLINIC | Age: 83
End: 2018-05-17
Payer: COMMERCIAL

## 2018-05-17 VITALS
SYSTOLIC BLOOD PRESSURE: 106 MMHG | WEIGHT: 179 LBS | HEIGHT: 64 IN | DIASTOLIC BLOOD PRESSURE: 56 MMHG | HEART RATE: 80 BPM | BODY MASS INDEX: 30.56 KG/M2

## 2018-05-17 DIAGNOSIS — I10 ESSENTIAL HYPERTENSION: ICD-10-CM

## 2018-05-17 DIAGNOSIS — E78.2 MIXED HYPERLIPIDEMIA: ICD-10-CM

## 2018-05-17 DIAGNOSIS — I49.3 PVC'S (PREMATURE VENTRICULAR CONTRACTIONS): Primary | ICD-10-CM

## 2018-05-17 PROCEDURE — 99214 OFFICE O/P EST MOD 30 MIN: CPT | Performed by: INTERNAL MEDICINE

## 2018-05-17 RX ORDER — CALCIUM CARBONATE 500(1250)
500 TABLET ORAL DAILY
COMMUNITY

## 2018-05-17 NOTE — PROGRESS NOTES
Cardiology Follow Up    [facility]  1935  [id number]  HEART & VASCULAR  [address] [facility] Reche Severin  [address] [place] [address]    1  PVC's (premature ventricular contractions)     2  Essential hypertension     3  Mixed hyperlipidemia         Interval History:     Mrs Kanwal Mayo comes in for follow-up given her history of palpitations and PVCs  [place] Reed followed with me between 2010 - 2012  I initially saw her due to symptoms of lightheadedness and orthostasis  She was also having some atypical chest burning symptoms  I had her undergo a workup at that time  Her Holter and echo were both essentially normal  Her stress nuclear study demonstrated a reversible anterior defect, but was also affected by breast attenuation artifact  Regardless I had her undergo a cardiac catheterization that demonstrated no significant obstructive coronary artery disease  to 2 years ago she came back to see me because she was having some exertional dyspnea and continued with her chronic palpitations  At that time I had her undergo a workup again  Her stress nuclear, holter and echo  Her stress nuclear study was normal and her echocardiogram showed normal LV systolic function with mild LVH  Her Holter monitor demonstrated about 1500 PVCs in a 48 hour period  Her blood pressure was running a bit low and she was feeling better from a palpitation standpoint  Therefore we cut her metoprolol down to 25 mg daily  She has tolerated this nicely  Rand Balls overall feels well  She still will get intermittent palpitations, particularly at night, but they are tolerable  No lightheadedness or syncope  She denies any chest pain or any symptoms of angina  She denies any symptoms of congestive heart failure         Patient Active Problem List   Diagnosis    Diabetes mellitus ([address] )    Hypertension    Hyperlipidemia    PVC's (premature ventricular contractions)     Past Medical History:   Diagnosis Date    [facility] ) 2005    h/o     Cataract     OD    Diabetes mellitus ([facility] )     Full dentures     Heart palpitations     Hyperlipidemia     Hypertension     Limb alert care status     LEFT    Wears glasses      Social History     Social History    Marital status:      Spouse name: N/A    Number of children: N/A    Years of education: N/A     Occupational History    Not on file  Social History Main Topics    Smoking status: Never Smoker    Smokeless tobacco: Never Used    Alcohol use No    Drug use: No    Sexual activity: No     Other Topics Concern    Not on file     Social History Narrative    No narrative on file      No family history on file  Past Surgical History:   Procedure Laterality Date    BREAST SURGERY      CATARACT EXTRACTION W/  INTRAOCULAR LENS IMPLANT Left     CHOLECYSTECTOMY      HERNIA REPAIR      HYSTERECTOMY      JOINT REPLACEMENT      fito knee replacements    MASTECTOMY Left     w/SLN bx    [place] REMV CATARACT EXTRACAP,INSERT LENS Right 11/16/2016    Procedure: EXTRACTION EXTRACAPSULAR CATARACT PHACO INTRAOCULAR LENS (IOL); Surgeon: Hanna Sutton MD;  Location: [facility] OR;  Service: Ophthalmology    TONSILLECTOMY      VAGINAL DELIVERY      X 4       Current Outpatient Prescriptions:     amitriptyline (ELAVIL) 10 mg tablet, Take by mouth 2 (two) times a day  , Disp: , Rfl:     ascorbic acid (VITAMIN C) 250 mg tablet, Take 250 mg by mouth daily, Disp: , Rfl:     aspirin 81 MG tablet, Aspirin 81 MG Oral Tablet Take 1 tablet daily  Refills: 0    Be, Theta [place] ;  Active, Disp: , Rfl:     atorvastatin (LIPITOR) 40 mg tablet, TAKE 1 TABLET DAILY, Disp: 90 tablet, Rfl: 0    Calcium 500 MG tablet, Take by mouth, Disp: , Rfl:     Canagliflozin (INVOKANA) 300 MG TABS, Take 1 tablet (300 mg total) by mouth daily, Disp: 90 tablet, Rfl: 1    Co-Enzyme Q10 200 MG CAPS, Co-Enzyme Q-10 100 MG Oral Capsule  Refills: 0   , M D ; Active, Disp: , Rfl:     cyanocobalamin (CVS VITAMIN B-12) 100 MCG tablet, Take by mouth daily  , Disp: , Rfl:     Dapagliflozin Propanediol (FARXIGA) 10 MG TABS, Take by mouth daily, Disp: , Rfl:     Empagliflozin (JARDIANCE) 25 MG TABS, Take 1 tablet (25 mg total) by mouth every morning, Disp: 90 tablet, Rfl: 3    gabapentin (NEURONTIN) 100 mg capsule, TAKE 1 CAPSULE 3 TIMES A   DAY, Disp: 270 capsule, Rfl: 0    hyoscyamine (LEVBID) 0 375 mg 12 hr tablet, Take by mouth daily at bedtime  , Disp: , Rfl:     indapamide (LOZOL) 1 25 mg tablet, Take 1 25 mg by mouth daily  , Disp: , Rfl:     JANUVIA 100 MG tablet, TAKE 1 TABLET DAILY  , Disp: 30 tablet, Rfl: 2    metoprolol succinate (TOPROL-XL) 25 mg 24 hr tablet, Take by mouth daily  , Disp: , Rfl:     Multiple Vitamin (MULTI VITAMIN DAILY PO), Take by mouth, Disp: , Rfl:     rOPINIRole (REQUIP) 2 mg tablet, Take 2 mg by mouth daily at bedtime  , Disp: , Rfl:   Allergies   Allergen Reactions    Hydrocodone-Acetaminophen GI Intolerance    Wound Dressing Adhesive     Latex Rash     "redness"       Labs:  Lab Results   Component Value Date     11/20/2017    K 3 7 11/20/2017    CL 98 11/20/2017    CO2 31 11/20/2017    BUN 19 11/20/2017    CREATININE 0 90 (H) 11/20/2017    GLUCOSE 171 (H) 11/02/2017    GLUCOSE 148 (H) 09/25/2015    CALCIUM 9 2 11/20/2017     Lab Results   Component Value Date    WBC 7 38 11/02/2017    WBC 8 88 10/01/2014    HGB 13 1 11/02/2017    HGB 10 2 (L) 10/01/2014    HCT 40 7 11/02/2017    HCT 31 7 (L) 10/01/2014    MCV 87 11/02/2017    MCV 85 10/01/2014     11/02/2017     10/01/2014     Lab Results   Component Value Date    CHOL 131 11/20/2017    TRIG 152 (H) 11/20/2017    HDL 35 (L) 11/20/2017     Imaging: No results found  Review of Systems:  Review of Systems   HENT: Negative  Eyes: Negative  Respiratory: Negative  Cardiovascular: Positive for palpitations  Gastrointestinal: Negative      Musculoskeletal: Negative  Skin: Negative  Allergic/Immunologic: Negative  Neurological: Negative  Hematological: Negative  Psychiatric/Behavioral: Negative  All other systems reviewed and are negative  Physical Exam:  Physical Exam   Constitutional: She is oriented to person, place, and time  She appears well-developed and well-nourished  HENT:   Head: Normocephalic and atraumatic  Eyes: EOM are normal  Pupils are equal, round, and reactive to light  Right eye exhibits no discharge  Left eye exhibits no discharge  No scleral icterus  Neck: Normal range of motion  Neck supple  No JVD present  No thyromegaly present  Cardiovascular: Normal rate, regular rhythm, S1 normal, S2 normal, normal heart sounds, intact distal pulses and normal pulses  No extrasystoles are present  Exam reveals no gallop and no friction rub  No murmur heard  Pulmonary/Chest: Effort normal and breath sounds normal  No respiratory distress  She has no wheezes  She has no rales  Abdominal: Soft  Bowel sounds are normal  She exhibits no distension  There is no tenderness  Musculoskeletal: Normal range of motion  She exhibits no edema, tenderness or deformity  Neurological: She is alert and oriented to person, place, and time  No cranial nerve deficit  Skin: Skin is warm and dry  No rash noted  Psychiatric: She has a normal mood and affect  Judgment and thought content normal    Nursing note and vitals reviewed  Discussion/Summary:    Michaelle Deluna overall is doing well  Her palpitations are minimal and she is active without limitations  No changes in medical therapy were made  We will follow yearly at this point  She knows to call if symptoms were to accelerate her worsen  Counseling / Coordination of Care  Total floor / unit time spent today 25 minutes  Greater than 50% of total time was spent with the patient and / or family counseling and / or coordination of care

## 2018-06-04 DIAGNOSIS — E11.9 TYPE 2 DIABETES MELLITUS WITHOUT COMPLICATION, WITHOUT LONG-TERM CURRENT USE OF INSULIN (HCC): ICD-10-CM

## 2018-06-04 NOTE — TELEPHONE ENCOUNTER
Needs a script sent to  [facility] janeth for jaudiance to be able to appeal decision from insurance company

## 2018-06-25 ENCOUNTER — HOSPITAL ENCOUNTER (OUTPATIENT)
Dept: BONE DENSITY | Facility: IMAGING CENTER | Age: 83
Discharge: HOME | End: 2018-06-25
Payer: COMMERCIAL

## 2018-06-25 DIAGNOSIS — G25.81 RESTLESS LEG SYNDROME: Primary | ICD-10-CM

## 2018-06-25 DIAGNOSIS — Z12.31 ENCOUNTER FOR SCREENING MAMMOGRAM FOR MALIGNANT NEOPLASM OF BREAST: ICD-10-CM

## 2018-06-25 DIAGNOSIS — I10 ESSENTIAL HYPERTENSION: Primary | ICD-10-CM

## 2018-06-25 PROCEDURE — 77067 SCR MAMMO BI INCL CAD: CPT

## 2018-06-25 RX ORDER — ROPINIROLE 2 MG/1
TABLET, FILM COATED ORAL
Qty: 90 TABLET | Refills: 1 | Status: SHIPPED | OUTPATIENT
Start: 2018-06-25

## 2018-06-25 RX ORDER — METOPROLOL SUCCINATE 25 MG/1
TABLET, EXTENDED RELEASE ORAL
Qty: 90 TABLET | Refills: 3 | Status: SHIPPED | OUTPATIENT
Start: 2018-06-25

## 2018-07-31 DIAGNOSIS — E11.42 DIABETIC POLYNEUROPATHY ASSOCIATED WITH TYPE 2 DIABETES MELLITUS (HCC): ICD-10-CM

## 2018-07-31 NOTE — TELEPHONE ENCOUNTER
CALLED HOME # LISTED AND SOMEONE STATED THAT NO ONE BY THAT NAME LIVES THERE     VM ON PT'S DTR # PT DUE LABS AND OV

## 2018-08-01 RX ORDER — GABAPENTIN 100 MG/1
CAPSULE ORAL
Qty: 270 CAPSULE | Refills: 0 | Status: SHIPPED | OUTPATIENT
Start: 2018-08-01 | End: 2018-08-27 | Stop reason: SDUPTHER

## 2018-08-14 DIAGNOSIS — E11.9 TYPE 2 DIABETES MELLITUS WITHOUT COMPLICATION, UNSPECIFIED WHETHER LONG TERM INSULIN USE (HCC): Primary | ICD-10-CM

## 2018-08-15 ENCOUNTER — TELEPHONE (OUTPATIENT)
Dept: FAMILY MEDICINE CLINIC | Facility: CLINIC | Age: 83
End: 2018-08-15

## 2018-08-15 LAB
ALBUMIN SERPL-MCNC: 3.9 G/DL (ref 3.6–5.1)
ALBUMIN/GLOB SERPL: 1.3 (CALC) (ref 1–2.5)
ALP SERPL-CCNC: 54 U/L (ref 33–130)
ALT SERPL-CCNC: 52 U/L (ref 6–29)
AST SERPL-CCNC: 40 U/L (ref 10–35)
BILIRUB SERPL-MCNC: 0.8 MG/DL (ref 0.2–1.2)
BUN SERPL-MCNC: 16 MG/DL (ref 7–25)
BUN/CREAT SERPL: 18 (CALC) (ref 6–22)
CALCIUM SERPL-MCNC: 9.1 MG/DL (ref 8.6–10.4)
CHLORIDE SERPL-SCNC: 100 MMOL/L (ref 98–110)
CHOLEST SERPL-MCNC: 124 MG/DL
CHOLEST/HDLC SERPL: 3.4 (CALC)
CO2 SERPL-SCNC: 26 MMOL/L (ref 20–32)
CREAT SERPL-MCNC: 0.91 MG/DL (ref 0.6–0.88)
EST. AVERAGE GLUCOSE BLD GHB EST-MCNC: 174 (CALC)
EST. AVERAGE GLUCOSE BLD GHB EST-SCNC: 9.7 (CALC)
GLOBULIN SER CALC-MCNC: 3.1 G/DL (CALC) (ref 1.9–3.7)
GLUCOSE SERPL-MCNC: 149 MG/DL (ref 65–99)
HBA1C MFR BLD: 7.7 % OF TOTAL HGB
HDLC SERPL-MCNC: 37 MG/DL
LDLC SERPL CALC-MCNC: 61 MG/DL (CALC)
NONHDLC SERPL-MCNC: 87 MG/DL (CALC)
POTASSIUM SERPL-SCNC: 3.6 MMOL/L (ref 3.5–5.3)
PROT SERPL-MCNC: 7 G/DL (ref 6.1–8.1)
SL AMB EGFR AFRICAN AMERICAN: 69 ML/MIN/1.73M2
SL AMB EGFR NON AFRICAN AMERICAN: 59 ML/MIN/1.73M2
SODIUM SERPL-SCNC: 138 MMOL/L (ref 135–146)
TRIGL SERPL-MCNC: 180 MG/DL

## 2018-08-15 NOTE — TELEPHONE ENCOUNTER
8/16/18  Patient called and provided correct phone # -- should be [phone] --- [phone] was Incorrect    Advised as per Dr Razia Ring and appointment provided for 8/27/18 at [phone])      Message for daughter to either call office with Mom's # or have Patient call us directly -- # we have on chart is wrong          ----- Message from Amy Salmeron MD sent at 8/15/2018 12:40 PM EDT -----  Call patient with lab result-do for MM appt- A1C is high

## 2018-08-25 PROBLEM — K21.9 GASTROESOPHAGEAL REFLUX DISEASE: Status: ACTIVE | Noted: 2017-11-08

## 2018-08-25 PROBLEM — R74.8 ELEVATED LIVER ENZYMES: Status: ACTIVE | Noted: 2017-11-08

## 2018-08-27 ENCOUNTER — OFFICE VISIT (OUTPATIENT)
Dept: FAMILY MEDICINE CLINIC | Facility: CLINIC | Age: 83
End: 2018-08-27
Payer: COMMERCIAL

## 2018-08-27 VITALS
SYSTOLIC BLOOD PRESSURE: 108 MMHG | RESPIRATION RATE: 14 BRPM | HEART RATE: 84 BPM | OXYGEN SATURATION: 97 % | BODY MASS INDEX: 29.19 KG/M2 | DIASTOLIC BLOOD PRESSURE: 60 MMHG | WEIGHT: 171 LBS | HEIGHT: 64 IN

## 2018-08-27 DIAGNOSIS — I10 ESSENTIAL HYPERTENSION: ICD-10-CM

## 2018-08-27 DIAGNOSIS — E11.42 DIABETIC POLYNEUROPATHY ASSOCIATED WITH TYPE 2 DIABETES MELLITUS (HCC): ICD-10-CM

## 2018-08-27 DIAGNOSIS — G25.81 RESTLESS LEG SYNDROME: ICD-10-CM

## 2018-08-27 DIAGNOSIS — E78.5 HYPERLIPIDEMIA ASSOCIATED WITH TYPE 2 DIABETES MELLITUS (HCC): ICD-10-CM

## 2018-08-27 DIAGNOSIS — E11.9 CONTROLLED TYPE 2 DIABETES MELLITUS WITHOUT COMPLICATION, WITHOUT LONG-TERM CURRENT USE OF INSULIN (HCC): Primary | ICD-10-CM

## 2018-08-27 DIAGNOSIS — E11.69 HYPERLIPIDEMIA ASSOCIATED WITH TYPE 2 DIABETES MELLITUS (HCC): ICD-10-CM

## 2018-08-27 PROCEDURE — 3074F SYST BP LT 130 MM HG: CPT | Performed by: FAMILY MEDICINE

## 2018-08-27 PROCEDURE — 99214 OFFICE O/P EST MOD 30 MIN: CPT | Performed by: FAMILY MEDICINE

## 2018-08-27 PROCEDURE — 3008F BODY MASS INDEX DOCD: CPT | Performed by: FAMILY MEDICINE

## 2018-08-27 PROCEDURE — 3078F DIAST BP <80 MM HG: CPT | Performed by: FAMILY MEDICINE

## 2018-08-27 RX ORDER — INDAPAMIDE 1.25 MG/1
1.25 TABLET, FILM COATED ORAL DAILY
Qty: 90 TABLET | Refills: 1 | Status: SHIPPED | OUTPATIENT
Start: 2018-08-27

## 2018-08-27 RX ORDER — ATORVASTATIN CALCIUM 40 MG/1
40 TABLET, FILM COATED ORAL DAILY
Qty: 90 TABLET | Refills: 1 | Status: SHIPPED | OUTPATIENT
Start: 2018-08-27

## 2018-08-27 RX ORDER — GABAPENTIN 100 MG/1
100 CAPSULE ORAL 3 TIMES DAILY
Qty: 270 CAPSULE | Refills: 1 | Status: SHIPPED | OUTPATIENT
Start: 2018-08-27

## 2018-08-27 NOTE — PROGRESS NOTES
[address]        NAME: Suzi King is a 82 y o  female  : 1935    MRN: [id number]  DATE: 2018  TIME: 4:43 PM    Assessment and Plan   Controlled type 2 diabetes mellitus without complication, without long-term current use of insulin ([facility] ) [E11 9]  1  Controlled type 2 diabetes mellitus without complication, without long-term current use of insulin (HCC)  Dapagliflozin Propanediol 10 MG TABS   2  Diabetic polyneuropathy associated with type 2 diabetes mellitus (HCC)  gabapentin (NEURONTIN) 100 mg capsule   3  Essential hypertension  indapamide (LOZOL) 1 25 mg tablet   4  Hyperlipidemia associated with type 2 diabetes mellitus ([facility])  atorvastatin (LIPITOR) 40 mg tablet   5  Restless leg syndrome         Diabetes mellitus type II, controlled ([facility] )  Lab Results   Component Value Date    HGBA1C 7 7 (H) 2018       No results for input(s): POCGLU in the last 72 hours  The patient will continue current medications, but will need to substitute [place] for [place]  A1c is at 7 7%  I will not adjust meds at this point because she is relocating and at age 82   9 May be satisfactory  She cannot take metformin due to previous diarrhea  There also was a cost issue here  She is due for an eye exam also  Blood Sugar Average: Last 72 hrs:      Hyperlipidemia associated with type 2 diabetes mellitus ([facility] )  Lab Results   Component Value Date    HGBA1C 7 7 (H) 2018       No results for input(s): POCGLU in the last 72 hours  Continue atorvastatin at current dose  This should be recheck in 6-12 months with the new physician  Blood Sugar Average: Last 72 hrs:      Hypertension  Continue current meds for high blood pressure  Monitor goal was less than 130/80  Hypertension-    Patient Instructions     Patient Instructions   Continue current medications  Once you find a new physician, they will have to send a record release to [facility]    Continue to improve her diet and stay active  Flu shot at the pharmacy in 6-8 weeks  Consider shingles vaccine at the pharmacy  Chief Complaint     Chief Complaint   Patient presents with    Follow-up     mm - review labs, med refill    History of Present Illness       Patient comes in for routine medical care  1) diabetes-is compliant with current medications  A1c at 7 7 percent  Is on maximum dose of Januvia and Jardiance  The Jardiance needs to be switched due to formulary issues  Unable take metformin in the past due to her irritable bowel syndrome  2) hypertension-good control current medications  No cardiac symptoms  3) hyperlipidemia-adequate levels with current medication  4) neuropathy-this has been stable  5) restless leg syndrome also stable on current medications  She states Gastroenterology right these for her  6) irritable bowel syndrome-this is followed by Dr Shane Costello  Patient also reports she is relocating to [facility] will need to be finding a new doctor  This is having within the next 1 month  Review of Systems   Review of Systems   Constitutional: Negative for activity change, appetite change, diaphoresis and fatigue  Respiratory: Negative for cough, chest tightness, shortness of breath and wheezing  Cardiovascular: Negative for chest pain, palpitations and leg swelling  Fast or slow heart rate   Gastrointestinal: Negative for abdominal pain, blood in stool, constipation, diarrhea, nausea and vomiting  Genitourinary: Negative for difficulty urinating, dysuria and frequency  Musculoskeletal: Negative for arthralgias, gait problem, joint swelling and myalgias  Neurological: Negative for dizziness, weakness, light-headedness, numbness and headaches  Psychiatric/Behavioral: Negative for agitation, confusion, dysphoric mood and sleep disturbance  The patient is not nervous/anxious            Current Medications       Current Outpatient Prescriptions:     amitriptyline (ELAVIL) 10 mg tablet, Take by mouth 2 (two) times a day  , Disp: , Rfl:     ascorbic acid (VITAMIN C) 250 mg tablet, Take 250 mg by mouth daily, Disp: , Rfl:     aspirin 81 MG tablet, Aspirin 81 MG Oral Tablet Take 1 tablet daily  Refills: 0    Be, Corinne Fresh ; Active, Disp: , Rfl:     atorvastatin (LIPITOR) 40 mg tablet, Take 1 tablet (40 mg total) by mouth daily, Disp: 90 tablet, Rfl: 1    Calcium 500 MG tablet, Take by mouth, Disp: , Rfl:     Co-Enzyme Q10 200 MG CAPS, Co-Enzyme Q-10 100 MG Oral Capsule  Refills: 0   , M D ; Active, Disp: , Rfl:     cyanocobalamin (CVS VITAMIN B-12) 100 MCG tablet, Take by mouth daily  , Disp: , Rfl:     gabapentin (NEURONTIN) 100 mg capsule, Take 1 capsule (100 mg total) by mouth 3 (three) times a day, Disp: 270 capsule, Rfl: 1    hyoscyamine (LEVBID) 0 375 mg 12 hr tablet, Take by mouth daily at bedtime  , Disp: , Rfl:     indapamide (LOZOL) 1 25 mg tablet, Take 1 tablet (1 25 mg total) by mouth daily, Disp: 90 tablet, Rfl: 1    JANUVIA 100 MG tablet, TAKE 1 TABLET DAILY  , Disp: 30 tablet, Rfl: 2    metoprolol succinate (TOPROL-XL) 25 mg 24 hr tablet, TAKE 1 TABLET DAILY, Disp: 90 tablet, Rfl: 3    Multiple Vitamin (MULTI VITAMIN DAILY PO), Take by mouth, Disp: , Rfl:     rOPINIRole (REQUIP) 2 mg tablet, TAKE 1 TABLET AT BEDTIME, Disp: 90 tablet, Rfl: 1    Dapagliflozin Propanediol 10 MG TABS, Take 1 tablet (10 mg total) by mouth daily, Disp: 90 tablet, Rfl: 1    Current Allergies     Allergies as of 08/27/2018 - Reviewed 08/27/2018   Allergen Reaction Noted    Hydrocodone-acetaminophen GI Intolerance     Wound dressing adhesive  03/27/2013    Latex Rash 11/02/2017            The following portions of the patient's history were reviewed and updated as appropriate: allergies, current medications, past family history, past medical history, past social history, past surgical history and problem list      Past Medical History: Diagnosis Date    Abnormal findings on diagnostic imaging of breast     last assessed 10/16/14    Acquired breast deformity     last assessed 10/16/14    Aromatase inhibitor use     Blood clot in vein     Breast cancer ([facility] ) 2005    h/o     Breast lump     resolved 7/31/14    CAD (coronary artery disease)     Cataract     OD    Diabetes mellitus ([facility] )     Difficulty walking up stairs     Fibrocystic disease of breast     Full dentures     H/O tamoxifen therapy     Heart palpitations     Hemorrhoids     Hormone replacement therapy     6 years, estrace, estractest    Hx of radiation therapy     Hyperlipidemia     Hypertension     Limb alert care status     LEFT    Vision problem     Wears glasses        Past Surgical History:   Procedure Laterality Date    BREAST BIOPSY Left     BREAST LUMPECTOMY Left     BREAST SURGERY      CATARACT EXTRACTION W/  INTRAOCULAR LENS IMPLANT Left     CHOLECYSTECTOMY      HERNIA REPAIR      HYSTERECTOMY      JOINT REPLACEMENT      fito knee replacements    MASTECTOMY Left 10/07/2014    w/SLN bx    [place] REMV CATARACT EXTRACAP,INSERT LENS Right 11/16/2016    Procedure: EXTRACTION EXTRACAPSULAR CATARACT PHACO INTRAOCULAR LENS (IOL); Surgeon: Hoda Steiner MD;  Location:  MAIN OR;  Service: Ophthalmology    REDUCTION MAMMAPLASTY Right 10/07/2014    SENTINEL LYMPH NODE BIOPSY Left     TONSILLECTOMY      TUBAL LIGATION      VAGINAL DELIVERY      X 4       Family History   Problem Relation Age of Onset    Heart disease Family         cardiac    Diabetes Family     Cancer Paternal Aunt         larynx    Stomach cancer Paternal Aunt         dx age 76s   [place] No Known Problems Mother     Dementia Father     Cancer Paternal Aunt         leg,age unkown    Substance Abuse Neg Hx          Medications have been verified          Objective   /60 (BP Location: Right arm, Patient Position: Sitting, Cuff Size: Standard)   Pulse 84   Resp 14   Ht 5' 4" (1 626 m)   Wt 77 6 kg (171 lb)   SpO2 97%   BMI 29 35 kg/m²        Physical Exam     Physical Exam   Constitutional: She is oriented to person, place, and time  She appears well-developed and well-nourished  No distress  HENT:   Head: Normocephalic and atraumatic  Right Ear: Tympanic membrane, external ear and ear canal normal    Left Ear: Tympanic membrane, external ear and ear canal normal    Nose: No mucosal edema or rhinorrhea  Right sinus exhibits no maxillary sinus tenderness  Left sinus exhibits no maxillary sinus tenderness  Mouth/Throat: Uvula is midline  Mucous membranes are not pale and not dry  Normal dentition  No oropharyngeal exudate  Eyes: EOM are normal  Pupils are equal, round, and reactive to light  Right eye exhibits no discharge  Left eye exhibits no discharge  Neck: Normal range of motion  Neck supple  No thyromegaly present  Cardiovascular: Normal rate, regular rhythm, normal heart sounds and intact distal pulses  No murmur heard  Pulmonary/Chest: Effort normal and breath sounds normal  No respiratory distress  She has no wheezes  She has no rales  Abdominal: Soft  She exhibits no mass  There is no tenderness  There is no rebound  Musculoskeletal: Normal range of motion  She exhibits no edema or tenderness  Lymphadenopathy:     She has no cervical adenopathy  Neurological: She is alert and oriented to person, place, and time  No cranial nerve deficit  Skin: She is not diaphoretic  Psychiatric: She has a normal mood and affect   Her behavior is normal

## 2018-08-27 NOTE — ASSESSMENT & PLAN NOTE
Lab Results   Component Value Date    HGBA1C 7 7 (H) 08/14/2018       No results for input(s): POCGLU in the last 72 hours  Continue atorvastatin at current dose  This should be recheck in 6-12 months with the new physician    Blood Sugar Average: Last 72 hrs:

## 2018-08-27 NOTE — PATIENT INSTRUCTIONS
Continue current medications  Once you find a new physician, they will have to send a record release to [facility]  Continue to improve her diet and stay active  Flu shot at the pharmacy in 6-8 weeks  Consider shingles vaccine at the pharmacy

## 2018-08-27 NOTE — ASSESSMENT & PLAN NOTE
Lab Results   Component Value Date    HGBA1C 7 7 (H) 08/14/2018       No results for input(s): POCGLU in the last 72 hours  The patient will continue current medications, but will need to substitute [place] for [place]  A1c is at 7 7%  I will not adjust meds at this point because she is relocating and at age 82   9 May be satisfactory  She cannot take metformin due to previous diarrhea  There also was a cost issue here  She is due for an eye exam also    Blood Sugar Average: Last 72 hrs:

## 2018-09-10 ENCOUNTER — TELEPHONE (OUTPATIENT)
Dept: FAMILY MEDICINE CLINIC | Facility: CLINIC | Age: 83
End: 2018-09-10

## 2018-09-10 DIAGNOSIS — E11.9 TYPE 2 DIABETES MELLITUS WITHOUT COMPLICATION, WITHOUT LONG-TERM CURRENT USE OF INSULIN (HCC): Primary | ICD-10-CM

## 2018-09-20 ENCOUNTER — OFFICE VISIT (OUTPATIENT)
Dept: SURGICAL ONCOLOGY | Facility: HOSPITAL | Age: 83
End: 2018-09-20
Payer: COMMERCIAL

## 2018-09-20 VITALS
HEART RATE: 78 BPM | DIASTOLIC BLOOD PRESSURE: 70 MMHG | TEMPERATURE: 98.2 F | RESPIRATION RATE: 16 BRPM | HEIGHT: 64 IN | BODY MASS INDEX: 28.85 KG/M2 | SYSTOLIC BLOOD PRESSURE: 110 MMHG | WEIGHT: 169 LBS

## 2018-09-20 DIAGNOSIS — C50.112 MALIGNANT NEOPLASM OF CENTRAL PORTION OF LEFT BREAST IN FEMALE, ESTROGEN RECEPTOR POSITIVE (HCC): Primary | ICD-10-CM

## 2018-09-20 DIAGNOSIS — Z17.0 MALIGNANT NEOPLASM OF CENTRAL PORTION OF LEFT BREAST IN FEMALE, ESTROGEN RECEPTOR POSITIVE (HCC): Primary | ICD-10-CM

## 2018-09-20 PROBLEM — Z92.3 HX OF RADIATION THERAPY: Status: RESOLVED | Noted: 2018-09-20 | Resolved: 2018-09-20

## 2018-09-20 PROBLEM — Z79.811 AROMATASE INHIBITOR USE: Status: RESOLVED | Noted: 2018-09-20 | Resolved: 2018-09-20

## 2018-09-20 PROBLEM — Z92.29 H/O TAMOXIFEN THERAPY: Status: RESOLVED | Noted: 2018-09-20 | Resolved: 2018-09-20

## 2018-09-20 PROCEDURE — 99214 OFFICE O/P EST MOD 30 MIN: CPT | Performed by: SURGERY

## 2018-09-20 NOTE — PROGRESS NOTES
Surgical Oncology Follow Up       [facility] [address]    Arabella Allen  1935  [id number]  [address]  [facility] [address]    Chief Complaint   Patient presents with    Breast Cancer     Pt is here for 1 year follow up        Assessment/Plan   There are no diagnoses linked to this encounter  Advance Care Planning/Advance Directives:  Did not discuss  with the patient  Oncology History:       Breast cancer ([facility] )    3/11/2005 Surgery     Left breast lumpectomy, SLNB         2005 -  Radiation     WBRT         9/30/2014 Surgery     Left mastectomy         9/30/2014 Surgery     Right breast reduction         2/5/2015 Initial Diagnosis     Breast cancer ([facility] )        Hormone Therapy     Tamoxifen, Arimidex,  Completed 5 years            History of Present Illness: breast cancer follow up  -Interval History: none    Review of Systems:  Review of Systems   Constitutional: Negative  Negative for appetite change and fever  Eyes: Negative  Respiratory: Negative for shortness of breath  Cardiovascular: Negative  Gastrointestinal: Negative  Endocrine: Negative  Genitourinary: Negative  Musculoskeletal: Negative  Negative for arthralgias and myalgias  Skin: Negative  Allergic/Immunologic: Negative  Neurological: Negative  Hematological: Negative  Negative for adenopathy  Does not bruise/bleed easily  Psychiatric/Behavioral: Negative          Patient Active Problem List   Diagnosis    Diabetes mellitus type II, controlled ([facility] )    Hypertension    Hyperlipidemia associated with type 2 diabetes mellitus ([facility] )    PVC's (premature ventricular contractions)    Breast cancer (HCC)    Restless leg syndrome    Irritable bowel syndrome with diarrhea    [facility] poly[facility] with type 2 [facility]    Cataract, right    Coagulation test abnormality    Elevated liver enzymes    Gastroesophageal reflux disease     Past Medical History:   Diagnosis Date    Abnormal findings on diagnostic imaging of breast     last assessed 10/16/14    Acquired breast deformity     last assessed 10/16/14    Aromatase inhibitor use     Blood clot in vein     Breast cancer ([facility] ) 2005    h/o     Breast lump     resolved 7/31/14    CAD (coronary artery disease)     Cataract     OD    Diabetes mellitus ([facility] )     Difficulty walking up stairs     Fibrocystic disease of breast     Full dentures     H/O tamoxifen therapy     Heart palpitations     Hemorrhoids     Hormone replacement therapy     6 years, estrace, estractest    Hx of radiation therapy     Hyperlipidemia     Hypertension     Limb alert care status     LEFT    Vision problem     Wears glasses      Past Surgical History:   Procedure Laterality Date    BREAST BIOPSY Left     BREAST LUMPECTOMY Left     BREAST SURGERY      CATARACT EXTRACTION W/  INTRAOCULAR LENS IMPLANT Left     CHOLECYSTECTOMY      HERNIA REPAIR      HYSTERECTOMY      JOINT REPLACEMENT      fito knee replacements    MASTECTOMY Left 10/07/2014    w/SLN bx    W[place] REMV CATARACT EXTRACAP,INSERT LENS Right 11/16/2016    Procedure: EXTRACTION EXTRACAPSULAR CATARACT PHACO INTRAOCULAR LENS (IOL);   Surgeon: Ravi West MD;  Location: [facility] OR;  Service: Ophthalmology    REDUCTION MAMMAPLASTY Right 10/07/2014    SENTINEL LYMPH NODE BIOPSY Left     TONSILLECTOMY      TUBAL LIGATION      VAGINAL DELIVERY      X 4     Family History   Problem Relation Age of Onset    Heart disease Family         cardiac    Diabetes Family     Cancer Paternal Aunt         larynx    Stomach cancer Paternal Aunt         dx age 76s   Galina Wisdom No Known Problems Mother     Dementia Father     Cancer Paternal Aunt         leg,age unkown    Substance Abuse Neg Hx      Social History     Social History    Marital status:      Spouse name: N/A    Number of children: N/A    Years of education: N/A     Occupational History    Not on file  Social History Main Topics    Smoking status: Never Smoker    Smokeless tobacco: Never Used    Alcohol use No    Drug use: No    Sexual activity: No      Comment: birth control for 6 years     Other Topics Concern    Not on file     Social History Narrative    Caffeine use           Current Outpatient Prescriptions:     amitriptyline (ELAVIL) 10 mg tablet, Take by mouth 2 (two) times a day  , Disp: , Rfl:     ascorbic acid (VITAMIN C) 250 mg tablet, Take 250 mg by mouth daily, Disp: , Rfl:     aspirin 81 MG tablet, Aspirin 81 MG Oral Tablet Take 1 tablet daily  Refills: 0    Be, Liliana Escobar ; Active, Disp: , Rfl:     atorvastatin (LIPITOR) 40 mg tablet, Take 1 tablet (40 mg total) by mouth daily, Disp: 90 tablet, Rfl: 1    Calcium 500 MG tablet, Take by mouth, Disp: , Rfl:     Co-Enzyme Q10 200 MG CAPS, Co-Enzyme Q-10 100 MG Oral Capsule  Refills: 0   , M D ; Active, Disp: , Rfl:     cyanocobalamin (CVS VITAMIN B-12) 100 MCG tablet, Take by mouth daily  , Disp: , Rfl:     Dapagliflozin Propanediol 10 MG TABS, Take 1 tablet (10 mg total) by mouth daily, Disp: 90 tablet, Rfl: 1    gabapentin (NEURONTIN) 100 mg capsule, Take 1 capsule (100 mg total) by mouth 3 (three) times a day, Disp: 270 capsule, Rfl: 1    hyoscyamine (LEVBID) 0 375 mg 12 hr tablet, Take by mouth daily at bedtime  , Disp: , Rfl:     indapamide (LOZOL) 1 25 mg tablet, Take 1 tablet (1 25 mg total) by mouth daily, Disp: 90 tablet, Rfl: 1    JANUVIA 100 MG tablet, TAKE 1 TABLET DAILY  , Disp: 30 tablet, Rfl: 2    metoprolol succinate (TOPROL-XL) 25 mg 24 hr tablet, TAKE 1 TABLET DAILY, Disp: 90 tablet, Rfl: 3    Multiple Vitamin (MULTI VITAMIN DAILY PO), Take by mouth, Disp: , Rfl:     rOPINIRole (REQUIP) 2 mg tablet, TAKE 1 TABLET AT BEDTIME, Disp: 90 tablet, Rfl: 1    Empagliflozin (JARDIANCE) 25 MG TABS, Take 25 mg by mouth every morning, Disp: , Rfl:   Allergies   Allergen Reactions    Hydrocodone-Acetaminophen GI Intolerance    Wound Dressing Adhesive     Latex Rash     "redness"       The following portions of the patient's history were reviewed and updated as appropriate: allergies, current medications, past family history, past medical history, past social history, past surgical history and problem list         Vitals:    09/20/18 1348   BP: 110/70   Pulse: 78   Resp: 16   Temp: 98 2 °F (36 8 °C)       Physical Exam   Constitutional: She is oriented to person, place, and time  She appears well-developed and well-nourished  HENT:   Head: Normocephalic and atraumatic  Cardiovascular: Normal heart sounds  Pulmonary/Chest: Breath sounds normal  Right breast exhibits no inverted nipple, no mass, no nipple discharge, no skin change and no tenderness  Left breast exhibits skin change (mastectomy scar)  Left breast exhibits no mass and no tenderness  Abdominal: Soft  Lymphadenopathy:        Right axillary: No pectoral and no lateral adenopathy present  Left axillary: No pectoral and no lateral adenopathy present  Right: No supraclavicular adenopathy present  Left: No supraclavicular adenopathy present  Neurological: She is alert and oriented to person, place, and time  Psychiatric: She has a normal mood and affect  Results:      Imaging  06/25/2018 right screening mammogram is benign BI-RADS two with a density of two    I reviewed the above imaging data  Discussion/Summary:  28-year-old female status post completion mastectomy of the breast for history of left-sided carcinoma  She had a T1c N0 invasive ductal carcinoma diagnosed in 2005    There is no evidence of disease based on examination today or recent contralateral mammogram   Patient states that she is moving out of the area and will establish care for her future exams and mammograms one she is relocated

## 2019-08-13 DIAGNOSIS — E11.42 DIABETIC POLYNEUROPATHY ASSOCIATED WITH TYPE 2 DIABETES MELLITUS (HCC): ICD-10-CM

## 2019-08-14 RX ORDER — GABAPENTIN 100 MG/1
CAPSULE ORAL
Qty: 270 CAPSULE | Refills: 1 | OUTPATIENT
Start: 2019-08-14

## 2019-08-14 NOTE — TELEPHONE ENCOUNTER
Msg left for pt daughter to have pt call office--pt phone just rang--not VM    Pt has not been seen since 8/2018--on DM meds

## 2019-09-16 DIAGNOSIS — E11.42 DIABETIC POLYNEUROPATHY ASSOCIATED WITH TYPE 2 DIABETES MELLITUS (HCC): ICD-10-CM

## 2019-09-16 RX ORDER — GABAPENTIN 100 MG/1
CAPSULE ORAL
Qty: 270 CAPSULE | Refills: 1 | OUTPATIENT
Start: 2019-09-16

## 2019-09-16 NOTE — TELEPHONE ENCOUNTER
REFUSE--RX    Have left msg for pt & spoke to pt daughter--pt has not been seen since 8/2018   Trying to verify medications--& pt needs labs

## 2020-02-10 ENCOUNTER — TELEPHONE (OUTPATIENT)
Dept: GASTROENTEROLOGY | Facility: CLINIC | Age: 85
End: 2020-02-10

## 2020-02-10 NOTE — TELEPHONE ENCOUNTER
Dr Malini Hsu is due for recall colon-age 84  Send recall letter or close recall due to age-please advise   Thank you

## 2020-02-26 NOTE — TELEPHONE ENCOUNTER
Looks like she is average risk from the office notes    Since she is older than [de-identified], Okay to close recall

## 2020-07-28 ENCOUNTER — TELEPHONE (OUTPATIENT)
Dept: CARDIOLOGY CLINIC | Facility: CLINIC | Age: 85
End: 2020-07-28

## 2020-07-28 NOTE — TELEPHONE ENCOUNTER
Request received for appt 8/3 - called, faxed most recent OV and testing, release sent to [phone] for complete record

## 2020-09-15 ENCOUNTER — OFFICE VISIT (OUTPATIENT)
Dept: GASTROENTEROLOGY | Facility: CLINIC | Age: 85
End: 2020-09-15
Payer: COMMERCIAL

## 2020-09-15 VITALS
HEIGHT: 65 IN | TEMPERATURE: 97.5 F | BODY MASS INDEX: 28.32 KG/M2 | SYSTOLIC BLOOD PRESSURE: 108 MMHG | WEIGHT: 170 LBS | DIASTOLIC BLOOD PRESSURE: 64 MMHG

## 2020-09-15 DIAGNOSIS — R19.7 DIARRHEA, UNSPECIFIED TYPE: Primary | ICD-10-CM

## 2020-09-15 DIAGNOSIS — Z12.11 SCREENING FOR COLON CANCER: ICD-10-CM

## 2020-09-15 PROCEDURE — 99214 OFFICE O/P EST MOD 30 MIN: CPT | Performed by: NURSE PRACTITIONER

## 2020-09-15 RX ORDER — TETRACYCLINE HCL 500 MG
CAPSULE ORAL DAILY
COMMUNITY

## 2020-09-15 RX ORDER — DIMENHYDRINATE 50 MG
TABLET ORAL DAILY
COMMUNITY

## 2020-09-15 RX ORDER — AMPICILLIN TRIHYDRATE 250 MG
500 CAPSULE ORAL DAILY
COMMUNITY

## 2020-09-15 RX ORDER — PROPRANOLOL/HYDROCHLOROTHIAZID 40 MG-25MG
TABLET ORAL DAILY
COMMUNITY

## 2020-09-15 RX ORDER — CHOLECALCIFEROL (VITAMIN D3) 25 MCG
CAPSULE ORAL DAILY
COMMUNITY

## 2020-09-15 RX ORDER — CHOLESTYRAMINE 4 G/9G
1 POWDER, FOR SUSPENSION ORAL 2 TIMES DAILY WITH MEALS
Qty: 60 PACKET | Refills: 1 | Status: SHIPPED | OUTPATIENT
Start: 2020-09-15 | End: 2020-09-25

## 2020-09-15 RX ORDER — CALCIUM CARBONATE 260MG(650)
TABLET,CHEWABLE ORAL DAILY
COMMUNITY

## 2020-09-15 NOTE — PROGRESS NOTES
[id number] - Outpatient Follow-up Note  Maximilian Mccurdy Winter 85 y o  female MRN: [id number]  Encounter: [id number]    ASSESSMENT AND PLAN:      1  Diarrhea, unspecified type  Acute on chronic diarrhea  History of diarrhea predominant IBS that began approximately 10 years ago during a major life changing event  Diarrhea has worsened over the past 5-6 months  Exclude an infectious etiology, hyperthyroidism or celiac disease  Consider microscopic colitis or small intestinal bacterial overgrowth  Possibly an exacerbation of diarrhea predominant IBS  - CBC and differential; Future  - Comprehensive metabolic panel; Future  - TSH + Free T4; Future  - Celiac Disease Antibody Profile; Future  - Stool Enteric Bacterial Panel by PCR; Future  - Clostridium difficile toxin by PCR; Future  - increase fluid and fiber  - daily fiber supplementation  - cholestyramine (QUESTRAN) 4 g packet; Take 1 packet (4 g total) by mouth 2 (two) times a day with meals  Dispense: 60 packet; Refill: 1  - if diarrhea does not improve may require a colonoscopy    2  Screening for colon cancer  Last colonoscopy was performed in March of 2010 and was a negative examination for adenomatous colon polyps  No recall was advised based on advanced age  Followup Appointment:  2 weeks  ______________________________________________________________________    Chief Complaint   Patient presents with    Follow up-IBS-D getting worse     HPI:    Returns to the office after a 2 and a half year absence for progressive diarrhea  She does have a history of diarrhea predominant IBS that had been under good control for the past couple years up until fiber 6 months ago  Progressive diarrhea that does not feel like her typical IBS diarrhea  Typically will have 2-3 watery stools a day  Occasional episodes of incontinence  Denies night wakening    Occasional crampy lower abdominal pain that is alleviated following a bowel movement  Scant amount of bright red blood per rectum infrequently with the diarrhea  Denies any weight loss, fevers, chills, nausea or vomiting  Historical Information   Past Medical History:   Diagnosis Date    Acquired breast deformity     last assessed 10/16/14    Aromatase inhibitor use     Blood clot in vein     CAD (coronary artery disease)     Cataract     OD    Diabetes mellitus (HCC)     Difficulty walking up stairs     Fibrocystic disease of breast     Full dentures     H/O tamoxifen therapy     Heart palpitations     Hemorrhoids     Hormone replacement therapy     6 years, estrace, estractest    Hx of radiation therapy     Hyperlipidemia     Hypertension     Limb alert care status     LEFT    Vision problem     Wears glasses      Past Surgical History:   Procedure Laterality Date    BREAST BIOPSY Left     BREAST LUMPECTOMY Left     BREAST SURGERY      CATARACT EXTRACTION W/  INTRAOCULAR LENS IMPLANT Left     CHOLECYSTECTOMY      COLONOSCOPY  03/18/2010     sigmoid diverticulosis and internal hemorrhoids   HERNIA REPAIR      HYSTERECTOMY      JOINT REPLACEMENT      fito knee replacements    MASTECTOMY Left 10/07/2014    w/SLN bx    ME XCAPSL CTRC RMVL INSJ IO LENS PROSTH W/O ECP Right 11/16/2016    Procedure: EXTRACTION EXTRACAPSULAR CATARACT PHACO INTRAOCULAR LENS (IOL);   Surgeon: Preeti Singh MD;  Location: [facility];  Service: Ophthalmology    REDUCTION MAMMAPLASTY Right 10/07/2014    SENTINEL LYMPH NODE BIOPSY Left     TONSILLECTOMY      TUBAL LIGATION      VAGINAL DELIVERY      X 4     Social History     Substance and Sexual Activity   Alcohol Use No     Social History     Substance and Sexual Activity   Drug Use No     Social History     Tobacco Use   Smoking Status Never Smoker   Smokeless Tobacco Never Used     Family History   Problem Relation Age of Onset    Heart disease Family         cardiac    Diabetes Family     Cancer Paternal Aunt larynx    Stomach cancer Paternal Aunt         dx age 76s   [facility] No Known Problems Mother     Dementia Father     Cancer Paternal Aunt         leg,age unkown    Substance Abuse Neg Hx          Current Outpatient Medications:     Apoaequorin (PREVAGEN PO)    Apple Cider Vinegar 500 MG TABS    ascorbic acid (VITAMIN C) 250 mg tablet    aspirin 81 MG tablet    atorvastatin (LIPITOR) 40 mg tablet    Biotin w/ Vitamins C & E (HAIR/SKIN/NAILS PO)    Calcium 500 MG tablet    Cholecalciferol (Vitamin D-3) 25 MCG (1000 UT) CAPS    Cinnamon 500 MG capsule    Co-Enzyme Q10 200 MG CAPS    cyanocobalamin (CVS VITAMIN B-12) 100 MCG tablet    Dapagliflozin Propanediol 10 MG TABS    Flaxseed, Linseed, (Flax Seed Oil) 1000 MG CAPS    gabapentin (NEURONTIN) 100 mg capsule    hyoscyamine (LEVBID) 0 375 mg 12 hr tablet    indapamide (LOZOL) 1 25 mg tablet    JANUVIA 100 MG tablet    Lactobacillus-Inulin ([facility] DIGESTIVE HEALTH PO)    Magnesium Citrate 100 MG TABS    metoprolol succinate (TOPROL-XL) 25 mg 24 hr tablet    Multiple Vitamin (MULTI VITAMIN DAILY PO)    Multiple Vitamins-Minerals (AIRBORNE PO)    rOPINIRole (REQUIP) 2 mg tablet    Turmeric 500 MG CAPS    cholestyramine (QUESTRAN) 4 g packet    Empagliflozin (JARDIANCE) 25 MG TABS  Allergies   Allergen Reactions    Hydrocodone-Acetaminophen GI Intolerance    Wound Dressing Adhesive     Latex Rash     "redness"     Reviewed medications and allergies and updated as indicated    PHYSICAL EXAM:    Blood pressure 108/64, temperature 97 5 °F (36 4 °C), height 5' 5" (1 651 m), weight 77 1 kg (170 lb)  Body mass index is 28 29 kg/m²  General Appearance: NAD, cooperative, alert  Eyes: Anicteric  ENT:  Normocephalic   Neck:  Appears normal  Resp:  Clear to auscultation bilaterally; no rales, rhonchi or wheezing; respirations unlabored   CV:  S1 S2, Regular rate and rhythm; no murmur, rub, or gallop    GI:  Soft, non-tender, non-distended; normal bowel sounds; no masses, no organomegaly   Rectal: Deferred  Musculoskeletal: No cyanosis, clubbing or edema  Normal ROM  Skin:  No jaundice, rashes, or lesions   Psych: Normal affect, good eye contact  Neuro: No gross deficits, AAOx3    Lab Results:   Lab Results   Component Value Date    WBC 7 38 11/02/2017    HGB 13 1 11/02/2017    HCT 40 7 11/02/2017    MCV 87 11/02/2017     11/02/2017     Lab Results   Component Value Date     11/20/2017    K 3 6 08/14/2018     08/14/2018    CO2 26 08/14/2018    ANIONGAP 4 09/25/2015    BUN 16 08/14/2018    CREATININE 0 91 (H) 08/14/2018    GLUCOSE 148 (H) 09/25/2015    CALCIUM 9 1 08/14/2018    AST 40 (H) 08/14/2018    ALT 52 (H) 08/14/2018    ALKPHOS 54 08/14/2018    PROT 6 8 11/20/2017    BILITOT 0 6 11/20/2017    EGFR 55 11/02/2017     No results found for: IRON, TIBC, FERRITIN  No results found for: LIPASE    Radiology Results:   No results found

## 2020-09-15 NOTE — PATIENT INSTRUCTIONS
Increase fluid and fiber  Take 2 fiber supplements daily  Take Questran twice a day   Blood work and stool studies

## 2020-09-17 LAB
ALBUMIN SERPL-MCNC: 4.1 G/DL (ref 3.6–5.1)
ALBUMIN/GLOB SERPL: 1.4 (CALC) (ref 1–2.5)
ALP SERPL-CCNC: 61 U/L (ref 37–153)
ALT SERPL-CCNC: 26 U/L (ref 6–29)
AST SERPL-CCNC: 20 U/L (ref 10–35)
BASOPHILS # BLD AUTO: 56 CELLS/UL (ref 0–200)
BASOPHILS NFR BLD AUTO: 0.6 %
BILIRUB SERPL-MCNC: 0.5 MG/DL (ref 0.2–1.2)
BUN SERPL-MCNC: 21 MG/DL (ref 7–25)
BUN/CREAT SERPL: ABNORMAL (CALC) (ref 6–22)
CALCIUM SERPL-MCNC: 9.2 MG/DL (ref 8.6–10.4)
CHLORIDE SERPL-SCNC: 102 MMOL/L (ref 98–110)
CO2 SERPL-SCNC: 28 MMOL/L (ref 20–32)
CREAT SERPL-MCNC: 0.87 MG/DL (ref 0.6–0.88)
EOSINOPHIL # BLD AUTO: 260 CELLS/UL (ref 15–500)
EOSINOPHIL NFR BLD AUTO: 2.8 %
ERYTHROCYTE [DISTWIDTH] IN BLOOD BY AUTOMATED COUNT: 13.9 % (ref 11–15)
GLOBULIN SER CALC-MCNC: 2.9 G/DL (CALC) (ref 1.9–3.7)
GLUCOSE SERPL-MCNC: 172 MG/DL (ref 65–99)
HCT VFR BLD AUTO: 41.9 % (ref 35–45)
HGB BLD-MCNC: 13.6 G/DL (ref 11.7–15.5)
IGA SERPL-MCNC: 261 MG/DL (ref 70–320)
LYMPHOCYTES # BLD AUTO: 3088 CELLS/UL (ref 850–3900)
LYMPHOCYTES NFR BLD AUTO: 33.2 %
MCH RBC QN AUTO: 28.9 PG (ref 27–33)
MCHC RBC AUTO-ENTMCNC: 32.5 G/DL (ref 32–36)
MCV RBC AUTO: 89 FL (ref 80–100)
MONOCYTES # BLD AUTO: 623 CELLS/UL (ref 200–950)
MONOCYTES NFR BLD AUTO: 6.7 %
NEUTROPHILS # BLD AUTO: 5273 CELLS/UL (ref 1500–7800)
NEUTROPHILS NFR BLD AUTO: 56.7 %
PLATELET # BLD AUTO: 204 THOUSAND/UL (ref 140–400)
PMV BLD REES-ECKER: 12.2 FL (ref 7.5–12.5)
POTASSIUM SERPL-SCNC: 4.1 MMOL/L (ref 3.5–5.3)
PROT SERPL-MCNC: 7 G/DL (ref 6.1–8.1)
RBC # BLD AUTO: 4.71 MILLION/UL (ref 3.8–5.1)
SL AMB EGFR AFRICAN AMERICAN: 71 ML/MIN/1.73M2
SL AMB EGFR NON AFRICAN AMERICAN: 62 ML/MIN/1.73M2
SODIUM SERPL-SCNC: 139 MMOL/L (ref 135–146)
T4 FREE SERPL-MCNC: 1 NG/DL (ref 0.8–1.8)
TSH SERPL-ACNC: 4.06 MIU/L (ref 0.4–4.5)
TTG IGA SER-ACNC: 1 U/ML
WBC # BLD AUTO: 9.3 THOUSAND/UL (ref 3.8–10.8)

## 2020-09-25 ENCOUNTER — OFFICE VISIT (OUTPATIENT)
Dept: GASTROENTEROLOGY | Facility: CLINIC | Age: 85
End: 2020-09-25
Payer: COMMERCIAL

## 2020-09-25 VITALS
SYSTOLIC BLOOD PRESSURE: 118 MMHG | DIASTOLIC BLOOD PRESSURE: 74 MMHG | HEIGHT: 65 IN | WEIGHT: 168 LBS | TEMPERATURE: 96.9 F | HEART RATE: 76 BPM | BODY MASS INDEX: 27.99 KG/M2

## 2020-09-25 DIAGNOSIS — R19.7 DIARRHEA, UNSPECIFIED TYPE: Primary | ICD-10-CM

## 2020-09-25 DIAGNOSIS — K58.0 IRRITABLE BOWEL SYNDROME WITH DIARRHEA: ICD-10-CM

## 2020-09-25 DIAGNOSIS — Z12.11 SCREENING FOR COLON CANCER: ICD-10-CM

## 2020-09-25 PROCEDURE — 99214 OFFICE O/P EST MOD 30 MIN: CPT | Performed by: NURSE PRACTITIONER

## 2020-09-25 RX ORDER — CHOLESTYRAMINE 4 G/9G
1 POWDER, FOR SUSPENSION ORAL 2 TIMES DAILY WITH MEALS
Qty: 180 PACKET | Refills: 2 | Status: SHIPPED | OUTPATIENT
Start: 2020-09-25 | End: 2021-06-28 | Stop reason: SDUPTHER

## 2020-09-25 NOTE — PROGRESS NOTES
[id number] - Outpatient Follow-up Note  Maximilian Mccurdy Winter 85 y o  female MRN: [id number]  Encounter: [id number]    ASSESSMENT AND PLAN:      1  Diarrhea, unspecified type  Acute on chronic diarrhea  History of diarrhea predominant IBS for over 10 years  Suspect a component of post cholerectic diarrhea  Celiac disease and thyroid dysregulation excluded via the appropriate serology  Comprehensive stool studies included an infectious etiology  Resolved with daily fiber supplementation and Questran  - continue to increase fluid and fiber  - continue daily fiber supplementation  - continue Questran twice daily  - cholestyramine (QUESTRAN) 4 g packet; Take 1 packet (4 g total) by mouth 2 (two) times a day with meals  Dispense: 180 packet; Refill: 2    2  Screening for colon cancer  No further screening colonoscopy based on age  Last colonoscopy was performed in March of 2010 was a negative examination for adenomatous colon polyps  Followup Appointment:  As needed  ______________________________________________________________________    Chief Complaint   Patient presents with    Follow up-IBS-D, labs     HPI:   Returns to the office for follow-up to history of progressive diarrhea  No further diarrhea on daily fiber supplementation and Questran twice a day  Typically has 1 or 2 formed bowel movements daily  Denies any abdominal pain, fevers, chills, melena rectal bleeding  Appetite is good  Weight stable  Denies any nausea/vomiting      Historical Information   Past Medical History:   Diagnosis Date    Acquired breast deformity     last assessed 10/16/14    Aromatase inhibitor use     Blood clot in vein     CAD (coronary artery disease)     Cataract     OD    Diabetes mellitus (HCC)     Difficulty walking up stairs     Fibrocystic disease of breast     Full dentures     H/O tamoxifen therapy     Heart palpitations     Hemorrhoids     Hormone replacement therapy     6 years, estrace, estractest    Hx of radiation therapy     Hyperlipidemia     Hypertension     Limb alert care status     LEFT    Vision problem     Wears glasses      Past Surgical History:   Procedure Laterality Date    BREAST BIOPSY Left     BREAST LUMPECTOMY Left     BREAST SURGERY      CATARACT EXTRACTION W/  INTRAOCULAR LENS IMPLANT Left     CHOLECYSTECTOMY      COLONOSCOPY  03/18/2010     sigmoid diverticulosis and internal hemorrhoids   HERNIA REPAIR      HYSTERECTOMY      JOINT REPLACEMENT      fito knee replacements    MASTECTOMY Left 10/07/2014    w/SLN bx    IN XCAPSL CTRC RMVL INSJ IO LENS PROSTH W/O ECP Right 11/16/2016    Procedure: EXTRACTION EXTRACAPSULAR CATARACT PHACO INTRAOCULAR LENS (IOL);   Surgeon: Giancarlo Lewis MD;  Location:  MAIN OR;  Service: Ophthalmology    REDUCTION MAMMAPLASTY Right 10/07/2014    SENTINEL LYMPH NODE BIOPSY Left     TONSILLECTOMY      TUBAL LIGATION      VAGINAL DELIVERY      X 4     Social History     Substance and Sexual Activity   Alcohol Use No     Social History     Substance and Sexual Activity   Drug Use No     Social History     Tobacco Use   Smoking Status Never Smoker   Smokeless Tobacco Never Used     Family History   Problem Relation Age of Onset    Heart disease Family         cardiac    Diabetes Family     Cancer Paternal Aunt         larynx    Stomach cancer Paternal Aunt         dx age 76s   Jarek Lamprey No Known Problems Mother     Dementia Father     Cancer Paternal Aunt         leg,age unkown    Substance Abuse Neg Hx          Current Outpatient Medications:     Apoaequorin (PREVAGEN PO)    Apple Cider Vinegar 500 MG TABS    ascorbic acid (VITAMIN C) 250 mg tablet    aspirin 81 MG tablet    atorvastatin (LIPITOR) 40 mg tablet    Biotin w/ Vitamins C & E (HAIR/SKIN/NAILS PO)    Calcium 500 MG tablet    Cholecalciferol (Vitamin D-3) 25 MCG (1000 UT) CAPS    Cinnamon 500 MG capsule    Co-Enzyme Q10 200 MG CAPS    cyanocobalamin (CVS VITAMIN B-12) 100 MCG tablet    Dapagliflozin Propanediol 10 MG TABS    Empagliflozin (JARDIANCE) 25 MG TABS    Flaxseed, Linseed, (Flax Seed Oil) 1000 MG CAPS    gabapentin (NEURONTIN) 100 mg capsule    hyoscyamine (LEVBID) 0 375 mg 12 hr tablet    indapamide (LOZOL) 1 25 mg tablet    JANUVIA 100 MG tablet    Lactobacillus-Inulin ([facility] DIGESTIVE HEALTH PO)    Magnesium Citrate 100 MG TABS    metoprolol succinate (TOPROL-XL) 25 mg 24 hr tablet    Multiple Vitamin (MULTI VITAMIN DAILY PO)    Multiple Vitamins-Minerals (AIRBORNE PO)    rOPINIRole (REQUIP) 2 mg tablet    Turmeric 500 MG CAPS    cholestyramine (QUESTRAN) 4 g packet  Allergies   Allergen Reactions    Hydrocodone-Acetaminophen GI Intolerance    Wound Dressing Adhesive     Latex Rash     "redness"     Reviewed medications and allergies and updated as indicated    PHYSICAL EXAM:    Blood pressure 118/74, pulse 76, temperature (!) 96 9 °F (36 1 °C), height 5' 5" (1 651 m), weight 76 2 kg (168 lb)  Body mass index is 27 96 kg/m²  General Appearance: NAD, cooperative, alert, appears younger than stated age  Eyes: Anicteric  ENT:  Normocephalic    Neck: Appears normal  Resp:  Clear to auscultation bilaterally; no rales, rhonchi or wheezing; respirations unlabored   CV:  S1 S2, Regular rate and rhythm; no murmur, rub, or gallop  GI:  Soft, non-tender, non-distended; normal bowel sounds; no masses, no organomegaly   Rectal: Deferred  Musculoskeletal: No cyanosis, clubbing or edema  Normal ROM    Skin:  No jaundice, rashes, or lesions   Psych: Normal affect, good eye contact  Neuro: No gross deficits, AAOx3    Lab Results:   Lab Results   Component Value Date    WBC 9 3 09/16/2020    HGB 13 6 09/16/2020    HCT 41 9 09/16/2020    MCV 89 0 09/16/2020     09/16/2020     Lab Results   Component Value Date     11/20/2017    K 4 1 09/16/2020     09/16/2020    CO2 28 09/16/2020    ANIONGAP 4 09/25/2015    BUN 21 09/16/2020    CREATININE 0 87 09/16/2020    GLUCOSE 148 (H) 09/25/2015    CALCIUM 9 2 09/16/2020    AST 20 09/16/2020    ALT 26 09/16/2020    ALKPHOS 61 09/16/2020    PROT 6 8 11/20/2017    BILITOT 0 6 11/20/2017    EGFR 55 11/02/2017     No results found for: IRON, TIBC, FERRITIN  No results found for: LIPASE    Radiology Results:   No results found

## 2020-09-25 NOTE — PATIENT INSTRUCTIONS
Continue Questran twice daily  Continue to increase fluid and fiber  Continue daily fiber supplementation

## 2021-06-28 DIAGNOSIS — R19.7 DIARRHEA, UNSPECIFIED TYPE: ICD-10-CM

## 2021-06-28 NOTE — TELEPHONE ENCOUNTER
Pt calling for refill of cholestyramine powder  4 gm to [facility] (fax [id number])  Needs 90 day refill    [phone] - pt call back #

## 2021-06-28 NOTE — TELEPHONE ENCOUNTER
Last OV              09/25/2020 [facility]  Recommended F/U    PRN  Last refill       09/25/2020  90 day with 2 refills

## 2021-06-30 RX ORDER — CHOLESTYRAMINE 4 G/9G
1 POWDER, FOR SUSPENSION ORAL 2 TIMES DAILY WITH MEALS
Qty: 180 PACKET | Refills: 2 | Status: SHIPPED | OUTPATIENT
Start: 2021-06-30 | End: 2021-07-14 | Stop reason: SDUPTHER

## 2021-07-14 DIAGNOSIS — R19.7 DIARRHEA, UNSPECIFIED TYPE: ICD-10-CM

## 2021-07-14 NOTE — TELEPHONE ENCOUNTER
Pt left  mssg stating [facility] is waiting for script  [phone]  Called for more info (Pt name/)  I noted [facility]   She states she changed Ins/needs to mail order [facility] ph # [phone]; needs 3-mo supply

## 2021-07-14 NOTE — TELEPHONE ENCOUNTER
Medication entered again for your review and signature, this time for [facility] [employer]  Message left for local [facility] to cancel the previous order  Pt notified

## 2021-07-16 RX ORDER — CHOLESTYRAMINE 4 G/9G
1 POWDER, FOR SUSPENSION ORAL 2 TIMES DAILY WITH MEALS
Qty: 180 PACKET | Refills: 2 | Status: SHIPPED | OUTPATIENT
Start: 2021-07-16 | End: 2022-05-23 | Stop reason: SDUPTHER

## 2022-05-13 DIAGNOSIS — R19.7 DIARRHEA, UNSPECIFIED TYPE: ICD-10-CM

## 2022-05-13 RX ORDER — CHOLESTYRAMINE 4 G/9G
1 POWDER, FOR SUSPENSION ORAL 2 TIMES DAILY WITH MEALS
Qty: 180 PACKET | Refills: 1 | OUTPATIENT
Start: 2022-05-13 | End: 2023-02-07

## 2022-05-13 NOTE — TELEPHONE ENCOUNTER
Pt called to refill cholestyramine to [facility]  Advised needs appt  Will not be back in the area until August   Transferred to  to schedule appt  Please refill as requested

## 2022-05-13 NOTE — TELEPHONE ENCOUNTER
Please forward to [facility] advance practitioner to refill, [facility]   Thank you

## 2022-05-13 NOTE — TELEPHONE ENCOUNTER
Please forward to [facility] advanced practitioner to refill, patient follows with [facility] practice    Thank you

## 2022-05-23 NOTE — TELEPHONE ENCOUNTER
Spoke with patient  Has been out of med and having diarrhea  Requesting rx to local pharmacy  Pt has appt in August and is also out of the area right now  Please refill

## 2022-05-23 NOTE — TELEPHONE ENCOUNTER
Pt left  mssg stating she needs to get script/has been out for a while  CB# [phone]      Pt has OV sched'd 8/15/22 w/ NP

## 2022-05-24 RX ORDER — CHOLESTYRAMINE 4 G/9G
1 POWDER, FOR SUSPENSION ORAL 2 TIMES DAILY WITH MEALS
Qty: 60 PACKET | Refills: 0 | Status: SHIPPED | OUTPATIENT
Start: 2022-05-24 | End: 2022-05-27 | Stop reason: SDUPTHER

## 2022-05-27 DIAGNOSIS — R19.7 DIARRHEA, UNSPECIFIED TYPE: ICD-10-CM

## 2022-05-27 RX ORDER — CHOLESTYRAMINE 4 G/9G
1 POWDER, FOR SUSPENSION ORAL 2 TIMES DAILY WITH MEALS
Qty: 60 PACKET | Refills: 2 | Status: SHIPPED | OUTPATIENT
Start: 2022-05-27 | End: 2022-06-20 | Stop reason: SDUPTHER

## 2022-06-20 DIAGNOSIS — R19.7 DIARRHEA, UNSPECIFIED TYPE: ICD-10-CM

## 2022-06-20 NOTE — TELEPHONE ENCOUNTER
Called patient for clarification, no answer, LMASAEL  Patient is scheduled for follow up in August  She had refills in May to local [facility]  Does she require mail order or the [facility] pharmacy (Alexa on file) at present  Also patient has five pharmacies listed can be confirm which she uses

## 2022-06-20 NOTE — TELEPHONE ENCOUNTER
Pt returned call; clarifies she wants Rx to the mail order [facility] for 3-mo supply  Req'd we delete pharmacies she will no longer use/Pt OK'd deleting 2 of the listed pharmacies (if staying w/ children wants local pharmacies avail)

## 2022-06-20 NOTE — TELEPHONE ENCOUNTER
Pt left  mssg asking for refill  # [phone]  Called for more info  Pt asks for refill of cholestyramine to pharm in [place], then asked if she could have [facility] for 3-mo supply  She wishes to keep different pharmacies because uses different pharmacies if staying w/ children

## 2022-06-21 RX ORDER — CHOLESTYRAMINE 4 G/9G
1 POWDER, FOR SUSPENSION ORAL 2 TIMES DAILY WITH MEALS
Qty: 180 PACKET | Refills: 0 | Status: SHIPPED | OUTPATIENT
Start: 2022-06-21

## 2022-09-21 ENCOUNTER — TELEPHONE (OUTPATIENT)
Dept: OTHER | Facility: OTHER | Age: 87
End: 2022-09-21

## 2022-09-23 ENCOUNTER — OFFICE VISIT (OUTPATIENT)
Dept: GASTROENTEROLOGY | Facility: CLINIC | Age: 87
End: 2022-09-23
Payer: COMMERCIAL

## 2022-09-23 VITALS
WEIGHT: 174 LBS | HEIGHT: 66 IN | SYSTOLIC BLOOD PRESSURE: 108 MMHG | BODY MASS INDEX: 27.97 KG/M2 | DIASTOLIC BLOOD PRESSURE: 72 MMHG

## 2022-09-23 DIAGNOSIS — K58.0 IRRITABLE BOWEL SYNDROME WITH DIARRHEA: Primary | ICD-10-CM

## 2022-09-23 DIAGNOSIS — R19.7 DIARRHEA, UNSPECIFIED TYPE: ICD-10-CM

## 2022-09-23 PROCEDURE — 99214 OFFICE O/P EST MOD 30 MIN: CPT | Performed by: NURSE PRACTITIONER

## 2022-09-23 RX ORDER — CHOLESTYRAMINE 4 G/9G
1 POWDER, FOR SUSPENSION ORAL 2 TIMES DAILY WITH MEALS
Qty: 180 PACKET | Refills: 3 | Status: SHIPPED | OUTPATIENT
Start: 2022-09-23 | End: 2022-10-03 | Stop reason: SDUPTHER

## 2022-09-23 RX ORDER — CHOLESTYRAMINE 4 G/9G
1 POWDER, FOR SUSPENSION ORAL
Qty: 90 PACKET | Refills: 3 | Status: SHIPPED | OUTPATIENT
Start: 2022-09-23 | End: 2022-09-23 | Stop reason: CLARIF

## 2022-09-23 RX ORDER — DICYCLOMINE HYDROCHLORIDE 10 MG/1
10 CAPSULE ORAL
Qty: 120 CAPSULE | Refills: 1 | Status: SHIPPED | OUTPATIENT
Start: 2022-09-23 | End: 2022-10-24 | Stop reason: SDUPTHER

## 2022-09-23 NOTE — PROGRESS NOTES
[id number] [facility] - Outpatient Follow-up Note  Nae Santamaria Winter 82 y o  female MRN: [id number]  Encounter: [id number]    ASSESSMENT AND PLAN:    1  Irritable bowel syndrome with diarrhea  Continues to have occasional lower abdominal cramping with diarrhea-usually occurs with stress/anxiety  Currently takes Questran b i d  and daily fiber supplement with soft bowel movement 1-3 times per day  No alarm symptoms  -continue Questran b i d   -continue daily fiber supplement  -prescribed dicyclomine 1-4 times a day as needed for abdominal cramping and diarrhea  Can use prophylactically prior to triggering events      2  Abdominal bloating/flatulence  Several month history of daily abdominal bloating and increased flatulence  No recent change in appetite, no nausea or vomiting  No risk factors for SIBO but would recommend checking breath test  -breath test to evaluate for SIBO  Patient lives out of town and will be returning to this area in December  Plan to get testing done at that time  -Gas-X p r n   -IB Guard-recommend taking 2 times b i d  and as needed    Followup Appointment -3 months  ______________________________________________________________________    Chief Complaint   Patient presents with    Follow-up     GERD, IBS  Needs Rx  HPI: Presents in followup for irritable bowel syndrome  She continues to have occasional lower abdominal cramping and diarrhea which occurs usually with anxiety or stress  Normal bowel pattern is soft BM 1-3 daily in am - takes questran BID and daily fiber to help manage bowel movements with good results  Denies melena or rectal bleeding  Now having daily abdominal bloating which increases as day progresses and frequent flatulence  Denies abdominal pain    Her appetite is good and her weight has been stable    Historical Information   Past Medical History:   Diagnosis Date    Acquired breast deformity     last assessed 10/16/14    Aromatase inhibitor use     Blood clot in vein     CAD (coronary artery disease)     Cataract     OD    Diabetes mellitus (HCC)     Difficulty walking up stairs     Fibrocystic disease of breast     Full dentures     H/O tamoxifen therapy     Heart palpitations     Hemorrhoids     Hormone replacement therapy     6 years, estrace, estractest    Hx of radiation therapy     Hyperlipidemia     Hypertension     Limb alert care status     LEFT    Vision problem     Wears glasses      Past Surgical History:   Procedure Laterality Date    BREAST BIOPSY Left     BREAST LUMPECTOMY Left     BREAST SURGERY      CARDIAC PACEMAKER PLACEMENT      CATARACT EXTRACTION W/  INTRAOCULAR LENS IMPLANT Left     CHOLECYSTECTOMY      COLONOSCOPY  03/18/2010     sigmoid diverticulosis and internal hemorrhoids   HERNIA REPAIR      HYSTERECTOMY      JOINT REPLACEMENT      fito knee replacements    MASTECTOMY Left 10/07/2014    w/SLN bx    SD XCAPSL CTRC RMVL INSJ IO LENS PROSTH W/O ECP Right 11/16/2016    Procedure: EXTRACTION EXTRACAPSULAR CATARACT PHACO INTRAOCULAR LENS (IOL);   Surgeon: Alexsandra Thornton MD;  Location:  MAIN OR;  Service: Ophthalmology    REDUCTION MAMMAPLASTY Right 10/07/2014    SENTINEL LYMPH NODE BIOPSY Left     TONSILLECTOMY      TUBAL LIGATION      VAGINAL DELIVERY      X 4     Social History     Substance and Sexual Activity   Alcohol Use No     Social History     Substance and Sexual Activity   Drug Use No     Social History     Tobacco Use   Smoking Status Never Smoker   Smokeless Tobacco Never Used     Family History   Problem Relation Age of Onset    No Known Problems Mother     Dementia Father     Cancer Paternal Aunt         larynx    Stomach cancer Paternal Aunt         dx age 76s    Cancer Paternal Aunt         leg,age unkown    Heart disease Family         cardiac    Diabetes Family     Substance Abuse Neg Hx     Colon cancer Neg Hx     Colon polyps Neg Hx Current Outpatient Medications:     Apoaequorin (PREVAGEN PO)    Apple Cider Vinegar 500 MG TABS    ascorbic acid (VITAMIN C) 250 mg tablet    aspirin 81 MG tablet    atorvastatin (LIPITOR) 40 mg tablet    Biotin w/ Vitamins C & E (HAIR/SKIN/NAILS PO)    Calcium 500 MG tablet    Cholecalciferol (Vitamin D-3) 25 MCG (1000 UT) CAPS    cholestyramine (QUESTRAN) 4 g packet    Cinnamon 500 MG capsule    Co-Enzyme Q10 200 MG CAPS    cyanocobalamin (VITAMIN B-12) 100 MCG tablet    Dapagliflozin Propanediol 10 MG TABS    Flaxseed, Linseed, (Flax Seed Oil) 1000 MG CAPS    gabapentin (NEURONTIN) 100 mg capsule    indapamide (LOZOL) 1 25 mg tablet    JANUVIA 100 MG tablet    Magnesium Citrate 100 MG TABS    metoprolol succinate (TOPROL-XL) 25 mg 24 hr tablet    Multiple Vitamin (MULTI VITAMIN DAILY PO)    Multiple Vitamins-Minerals (AIRBORNE PO)    rOPINIRole (REQUIP) 2 mg tablet    Turmeric 500 MG CAPS    Empagliflozin 25 MG TABS    hyoscyamine (LEVBID) 0 375 mg 12 hr tablet    Lactobacillus-Inulin ([facility] DIGESTIVE [facility]ALTH PO)  Allergies   Allergen Reactions    Hydrocodone-Acetaminophen GI Intolerance    Wound Dressing Adhesive     Latex Rash     "redness"     Reviewed medications and allergies and updated as indicated    PHYSICAL EXAM:    Blood pressure 108/72, height 5' 5 5" (1 664 m), weight 78 9 kg (174 lb)  Body mass index is 28 51 kg/m²  General Appearance: NAD, cooperative, alert  Eyes: Anicteric  ENT:  Normocephalic, atraumatic, normal mucosa  Neck:  Supple, symmetrical, trachea midline  Resp:  Clear to auscultation bilaterally; no rales, rhonchi or wheezing; respirations unlabored   CV:  S1 S2, Regular rate and rhythm; no murmur, rub, or gallop  GI:  Soft, non-tender, non-distended; normal bowel sounds; no masses, no organomegaly   Rectal: Deferred  Musculoskeletal: No cyanosis, clubbing or edema  Normal ROM    Skin:  No jaundice, rashes, or lesions   Psych: Normal affect, good eye contact  Neuro: No gross deficits, AAOx3    Lab Results:   Lab Results   Component Value Date    WBC 9 3 09/16/2020    HGB 13 6 09/16/2020    HCT 41 9 09/16/2020    MCV 89 0 09/16/2020     09/16/2020     Lab Results   Component Value Date     11/20/2017    K 4 1 09/16/2020     09/16/2020    CO2 28 09/16/2020    ANIONGAP 4 09/25/2015    BUN 21 09/16/2020    CREATININE 0 87 09/16/2020    GLUCOSE 148 (H) 09/25/2015    CALCIUM 9 2 09/16/2020    AST 20 09/16/2020    ALT 26 09/16/2020    ALKPHOS 61 09/16/2020    PROT 6 8 11/20/2017    BILITOT 0 6 11/20/2017    EGFR 55 11/02/2017

## 2022-09-23 NOTE — LETTER
September 23, 2022     Darren Whittaker [place] [id number]    Patient: Doug Henao   YOB: 1935   Date of Visit: 9/23/2022       Dear Dr Jumana Patel: Thank you for referring Juancho Hope to me for evaluation  Below are my notes for this consultation  If you have questions, please do not hesitate to call me  I look forward to following your patient along with you  Sincerely,        KAYKAY Yancey        CC: No Recipients  KAYKAY Yancey  9/23/2022  2:17 PM  Sign when Signing Visit  [facility] [employer] [facility] - Outpatient Follow-up Note  Doug Henao 82 y o  female MRN: [id number]  Encounter: [id number]    ASSESSMENT AND PLAN:    1  Irritable bowel syndrome with diarrhea  Continues to have occasional lower abdominal cramping with diarrhea-usually occurs with stress/anxiety  Currently takes Questran b i d  and daily fiber supplement with soft bowel movement 1-3 times per day  No alarm symptoms  -continue Questran b i d   -continue daily fiber supplement  -prescribed dicyclomine 1-4 times a day as needed for abdominal cramping and diarrhea  Can use prophylactically prior to triggering events      2  Abdominal bloating/flatulence  Several month history of daily abdominal bloating and increased flatulence  No recent change in appetite, no nausea or vomiting  No risk factors for SIBO but would recommend checking breath test  -breath test to evaluate for SIBO  Patient lives out of town and will be returning to this area in December  Plan to get testing done at that time  -Gas-X p r n   -IB Guard-recommend taking 2 times b i d  and as needed    Followup Appointment -3 months  ______________________________________________________________________    Chief Complaint   Patient presents with    Follow-up     GERD, IBS  Needs Rx  HPI: Presents in followup for irritable bowel syndrome    She continues to have occasional lower abdominal cramping and diarrhea which occurs usually with anxiety or stress  Normal bowel pattern is soft BM 1-3 daily in am - takes questran BID and daily fiber to help manage bowel movements with good results  Denies melena or rectal bleeding  Now having daily abdominal bloating which increases as day progresses and frequent flatulence  Denies abdominal pain  Her appetite is good and her weight has been stable    Historical Information   Past Medical History:   Diagnosis Date    Acquired breast deformity     last assessed 10/16/14    Aromatase inhibitor use     Blood clot in vein     CAD (coronary artery disease)     Cataract     OD    Diabetes mellitus (HCC)     Difficulty walking up stairs     Fibrocystic disease of breast     Full dentures     H/O tamoxifen therapy     Heart palpitations     Hemorrhoids     Hormone replacement therapy     6 years, estrace, estractest    Hx of radiation therapy     Hyperlipidemia     Hypertension     Limb alert care status     LEFT    Vision problem     Wears glasses      Past Surgical History:   Procedure Laterality Date    BREAST BIOPSY Left     BREAST LUMPECTOMY Left     BREAST SURGERY      CARDIAC PACEMAKER PLACEMENT      CATARACT EXTRACTION W/  INTRAOCULAR LENS IMPLANT Left     CHOLECYSTECTOMY      COLONOSCOPY  03/18/2010     sigmoid diverticulosis and internal hemorrhoids   HERNIA REPAIR      HYSTERECTOMY      JOINT REPLACEMENT      fito knee replacements    MASTECTOMY Left 10/07/2014    w/SLN bx    [place] XCAPSL CTRC RMVL INSJ IO LENS PROSTH W/O ECP Right 11/16/2016    Procedure: EXTRACTION EXTRACAPSULAR CATARACT PHACO INTRAOCULAR LENS (IOL);   Surgeon: Scot Galeazzi, MD;  Location:  MAIN OR;  Service: Ophthalmology    REDUCTION MAMMAPLASTY Right 10/07/2014    SENTINEL LYMPH NODE BIOPSY Left     TONSILLECTOMY      TUBAL LIGATION      VAGINAL DELIVERY      X 4     Social History     Substance and Sexual Activity   Alcohol Use No Social History     Substance and Sexual Activity   Drug Use No     Social History     Tobacco Use   Smoking Status Never Smoker   Smokeless Tobacco Never Used     Family History   Problem Relation Age of Onset    No Known Problems Mother     Dementia Father     Cancer Paternal Aunt         larynx    Stomach cancer Paternal Aunt         dx age 76s    Cancer Paternal Aunt         leg,age unkown    Heart disease Family         cardiac    Diabetes Family     Substance Abuse Neg Hx     Colon cancer Neg Hx     Colon polyps Neg Hx          Current Outpatient Medications:     Apoaequorin (PREVAGEN PO)    Apple Cider Vinegar 500 MG TABS    ascorbic acid (VITAMIN C) 250 mg tablet    aspirin 81 MG tablet    atorvastatin (LIPITOR) 40 mg tablet    Biotin w/ Vitamins C & E (HAIR/SKIN/NAILS PO)    Calcium 500 MG tablet    Cholecalciferol (Vitamin D-3) 25 MCG (1000 UT) CAPS    cholestyramine (QUESTRAN) 4 g packet    Cinnamon 500 MG capsule    Co-Enzyme Q10 200 MG CAPS    cyanocobalamin (VITAMIN B-12) 100 MCG tablet    Dapagliflozin Propanediol 10 MG TABS    Flaxseed, Linseed, (Flax Seed Oil) 1000 MG CAPS    gabapentin (NEURONTIN) 100 mg capsule    indapamide (LOZOL) 1 25 mg tablet    JANUVIA 100 MG tablet    Magnesium Citrate 100 MG TABS    metoprolol succinate (TOPROL-XL) 25 mg 24 hr tablet    Multiple Vitamin (MULTI VITAMIN DAILY PO)    Multiple Vitamins-Minerals (AIRBORNE PO)    rOPINIRole (REQUIP) 2 mg tablet    Turmeric 500 MG CAPS    Empagliflozin 25 MG TABS    hyoscyamine (LEVBID) 0 375 mg 12 hr tablet    Lactobacillus-Inulin (CULTURELLE DIGESTIVE HEALTH PO)  Allergies   Allergen Reactions    Hydrocodone-Acetaminophen GI Intolerance    Wound Dressing Adhesive     Latex Rash     "redness"     Reviewed medications and allergies and updated as indicated    PHYSICAL EXAM:    Blood pressure 108/72, height 5' 5 5" (1 664 m), weight 78 9 kg (174 lb)   Body mass index is 28 51 kg/m²  General Appearance: NAD, cooperative, alert  Eyes: Anicteric  ENT:  Normocephalic, atraumatic, normal mucosa  Neck:  Supple, symmetrical, trachea midline  Resp:  Clear to auscultation bilaterally; no rales, rhonchi or wheezing; respirations unlabored   CV:  S1 S2, Regular rate and rhythm; no murmur, rub, or gallop  GI:  Soft, non-tender, non-distended; normal bowel sounds; no masses, no organomegaly   Rectal: Deferred  Musculoskeletal: No cyanosis, clubbing or edema  Normal ROM    Skin:  No jaundice, rashes, or lesions   Psych: Normal affect, good eye contact  Neuro: No gross deficits, AAOx3    Lab Results:   Lab Results   Component Value Date    WBC 9 3 09/16/2020    HGB 13 6 09/16/2020    HCT 41 9 09/16/2020    MCV 89 0 09/16/2020     09/16/2020     Lab Results   Component Value Date     11/20/2017    K 4 1 09/16/2020     09/16/2020    CO2 28 09/16/2020    ANIONGAP 4 09/25/2015    BUN 21 09/16/2020    CREATININE 0 87 09/16/2020    GLUCOSE 148 (H) 09/25/2015    CALCIUM 9 2 09/16/2020    AST 20 09/16/2020    ALT 26 09/16/2020    ALKPHOS 61 09/16/2020    PROT 6 8 11/20/2017    BILITOT 0 6 11/20/2017    EGFR 55 11/02/2017

## 2022-09-23 NOTE — PATIENT INSTRUCTIONS
IBGARD - over the counter peppermint oil for bloating and other symptoms   Can take 1 tablet 2x/day initially  Gasx as needed  Use dicyclomine for diarrhea with anxiety/stress

## 2022-09-28 ENCOUNTER — TELEPHONE (OUTPATIENT)
Dept: GASTROENTEROLOGY | Facility: CLINIC | Age: 87
End: 2022-09-28

## 2022-09-30 DIAGNOSIS — E78.5 HYPERLIPIDEMIA ASSOCIATED WITH TYPE 2 DIABETES MELLITUS (HCC): ICD-10-CM

## 2022-09-30 DIAGNOSIS — E11.69 HYPERLIPIDEMIA ASSOCIATED WITH TYPE 2 DIABETES MELLITUS (HCC): ICD-10-CM

## 2022-09-30 DIAGNOSIS — R19.7 DIARRHEA, UNSPECIFIED TYPE: ICD-10-CM

## 2022-09-30 RX ORDER — ATORVASTATIN CALCIUM 40 MG/1
40 TABLET, FILM COATED ORAL DAILY
Qty: 90 TABLET | Refills: 3 | Status: SHIPPED | OUTPATIENT
Start: 2022-09-30

## 2022-09-30 RX ORDER — CHOLESTYRAMINE 4 G/9G
1 POWDER, FOR SUSPENSION ORAL 2 TIMES DAILY WITH MEALS
Qty: 180 PACKET | Refills: 3 | OUTPATIENT
Start: 2022-09-30

## 2022-10-03 RX ORDER — CHOLESTYRAMINE 4 G/9G
1 POWDER, FOR SUSPENSION ORAL 2 TIMES DAILY WITH MEALS
Qty: 180 PACKET | Refills: 3 | Status: SHIPPED | OUTPATIENT
Start: 2022-10-03

## 2022-10-24 DIAGNOSIS — K58.0 IRRITABLE BOWEL SYNDROME WITH DIARRHEA: ICD-10-CM

## 2022-10-25 RX ORDER — DICYCLOMINE HYDROCHLORIDE 10 MG/1
10 CAPSULE ORAL
Qty: 120 CAPSULE | Refills: 5 | Status: SHIPPED | OUTPATIENT
Start: 2022-10-25

## 2022-12-05 ENCOUNTER — TELEPHONE (OUTPATIENT)
Dept: OTHER | Facility: OTHER | Age: 87
End: 2022-12-05

## 2022-12-05 NOTE — TELEPHONE ENCOUNTER
Pt  Hemanth Ross she is scheduled for a bacterial overgrowth breath test  She is concerned with the prep  She doesn't think she can do it and wants to speak to someone about it  Pt is requesting a call back

## 2022-12-05 NOTE — TELEPHONE ENCOUNTER
I spoke with patient reviewed instructions/concerns  She will come into the office tomorrow to  kit

## 2022-12-07 ENCOUNTER — OFFICE VISIT (OUTPATIENT)
Dept: GASTROENTEROLOGY | Facility: CLINIC | Age: 87
End: 2022-12-07

## 2022-12-07 DIAGNOSIS — R14.0 BLOATING: ICD-10-CM

## 2022-12-08 NOTE — PROGRESS NOTES
[facility]       Bacterial Overgrowth Analytical Record    Jennifer Henao 82 y o  female MRN: [id number]      Date of Test: 12/7/2022    Substrate Given: Lactulose    Ordering Provider: KAYKAY Coy    Medical Assistant: KAL    Symptoms: bloating    The patient presents for bacterial overgrowth testing  Patient fasted overnight  Baseline readings obtained  Breath test performed every 20 min for a total of 3 hr    Sample Clock Time ppmH2 ppmCH4 Co2% Wong   Baseline 9:00   4 1 4 2 1 30   #1  20 minutes 9:20 4 2 4 5 1 22   #2  40 minutes 9:40 7 4 3 7 1 48   #3  60 minutes 10:00 6 4 4 5 1 22   #4  80 minutes 10:20 4 3 4 2 1 30   #5  100 minutes 10:40 10 4 4 0 1 37   #6  120 minutes 11:00 9 4 3 8 1 44   #7  140 minutes 11:20 30 7 3 9 1 41   #8  160 minutes 11:40 35 6 4 5 1 22   #9  180 minutes 12:00 27 5 4 6 1 19       Physician interpretation: Breath test negative with no increase in hydrogen at 90 minutes

## 2023-01-05 DIAGNOSIS — R19.7 DIARRHEA, UNSPECIFIED TYPE: ICD-10-CM

## 2023-01-05 RX ORDER — CHOLESTYRAMINE 4 G/9G
1 POWDER, FOR SUSPENSION ORAL 2 TIMES DAILY WITH MEALS
Qty: 180 PACKET | Refills: 3 | Status: SHIPPED | OUTPATIENT
Start: 2023-01-05

## 2023-01-11 ENCOUNTER — TELEPHONE (OUTPATIENT)
Dept: GASTROENTEROLOGY | Facility: CLINIC | Age: 88
End: 2023-01-11

## 2023-01-11 ENCOUNTER — OFFICE VISIT (OUTPATIENT)
Dept: GASTROENTEROLOGY | Facility: CLINIC | Age: 88
End: 2023-01-11

## 2023-01-11 VITALS
SYSTOLIC BLOOD PRESSURE: 116 MMHG | WEIGHT: 176 LBS | DIASTOLIC BLOOD PRESSURE: 60 MMHG | BODY MASS INDEX: 28.28 KG/M2 | HEIGHT: 66 IN

## 2023-01-11 DIAGNOSIS — R19.7 DIARRHEA, UNSPECIFIED TYPE: Primary | ICD-10-CM

## 2023-01-11 RX ORDER — POTASSIUM BICARBONATE 25 MEQ/1
99 TABLET, EFFERVESCENT ORAL 2 TIMES DAILY
COMMUNITY

## 2023-01-11 RX ORDER — CHLORAL HYDRATE 500 MG
1000 CAPSULE ORAL DAILY
COMMUNITY

## 2023-01-11 NOTE — PROGRESS NOTES
[facility] [employer] [facility] - Outpatient Follow-up Note  Jannet Valerio Winter 82 y o  female MRN: [id number]  Encounter: [id number]    ASSESSMENT AND PLAN:      1  Diarrhea  2  Irritable bowel syndrome  Chronic intermittent diarrhea with associated abdominal cramping and urgency  Unable to identify triggering foods  Stress does exacerbate/precipitate symptoms  Recent breath test negative for SIBO  She currently takes Questran twice daily, 1 fiber tablet daily and peppermint oil twice daily  No improvement with dicyclomine  No alarm symptoms but symptoms do affect her lifestyle  Suspect diarrhea predominant IBS but recommend proceeding with colonoscopy to rule out microscopic colitis  If colonoscopy is negative, would consider trial of TCA  -Scheduled for colonoscopy at [facility]  -Continue Questran twice daily  -Peppermint oil 2 to 4 tablets daily  -Pepto-Bismol as needed  -Low-fat diet    3  Screening for colon cancer  Last colonoscopy was in 2010 and was normal   No recall recommended due to age      Followup Appointment: 6 months with MD  ______________________________________________________________________    Chief Complaint   Patient presents with   • follow up     HPI: Presents in follow-up today for persistent intermittent episodes of diarrhea which has been a chronic problem for at least 10 years  She experiences acute watery diarrhea with associated abdominal cramping and urgency  Usually occurs in early a m  and resolves later in the day  Unable to identify food triggers  Some increase in episodes noted with anxiety  Also reports excessive flatus and occasional abdominal bloating  Denies melena or rectal bleeding  She reports that she wears depends as she is afraid of incontinence  Avoids scheduling appointments or activity in a m  because of increased symptoms in the a m  Appetite is good, no nausea or vomiting and she denies unintentional weight loss    Denies GERD symptoms    She currently takes Questran twice daily and 1 fiber tablet daily  She is taking IBgard twice daily which has helped with abdominal bloating and gas  She tried taking dicyclomine with no improvement in her symptoms  Breath test 12/2022 was negative for SIBO  Last colonoscopy was in 2010 and was normal      Historical Information   Past Medical History:   Diagnosis Date   • Acquired breast deformity     last assessed 10/16/14   • Aromatase inhibitor use    • Blood clot in vein    • CAD (coronary artery disease)    • Cataract     OD   • Diabetes mellitus ([address] )    • Difficulty walking up stairs    • Fibrocystic disease of breast    • Full dentures    • H/O tamoxifen therapy    • Heart palpitations    • Hemorrhoids    • Hormone replacement therapy     6 years, estrace, estractest   • Hx of radiation therapy    • Hyperlipidemia    • Hypertension    • Limb alert care status     LEFT   • Vision problem    • Wears glasses      Past Surgical History:   Procedure Laterality Date   • BREAST BIOPSY Left    • BREAST LUMPECTOMY Left    • BREAST SURGERY     • CARDIAC PACEMAKER PLACEMENT     • CATARACT EXTRACTION W/  INTRAOCULAR LENS IMPLANT Left    • CHOLECYSTECTOMY     • COLONOSCOPY  03/18/2010     sigmoid diverticulosis and internal hemorrhoids  • HERNIA REPAIR     • HYSTERECTOMY     • JOINT REPLACEMENT      fito knee replacements   • MASTECTOMY Left 10/07/2014    w/SLN bx   • [place] XCAPSL CTRC RMVL INSJ IO LENS PROSTH W/O ECP Right 11/16/2016    Procedure: EXTRACTION EXTRACAPSULAR CATARACT PHACO INTRAOCULAR LENS (IOL);   Surgeon: Suni Silverio MD;  Location:  MAIN OR;  Service: Ophthalmology   • REDUCTION MAMMAPLASTY Right 10/07/2014   • SENTINEL LYMPH NODE BIOPSY Left    • TONSILLECTOMY     • TUBAL LIGATION     • VAGINAL DELIVERY      X 4     Social History     Substance and Sexual Activity   Alcohol Use No     Social History     Substance and Sexual Activity   Drug Use No     Social History     Tobacco Use Smoking Status Never   Smokeless Tobacco Never     Family History   Problem Relation Age of Onset   • No Known Problems Mother    • Dementia Father    • Cancer Paternal Aunt         larynx   • Stomach cancer Paternal Aunt         dx age 76s   • Cancer Paternal Aunt         leg,age unkown   • Heart disease Family         cardiac   • Diabetes Family    • Substance Abuse Neg Hx    • Colon cancer Neg Hx    • Colon polyps Neg Hx          Current Outpatient Medications:   •  Apple Cider Vinegar 500 MG TABS  •  ascorbic acid (VITAMIN C) 250 mg tablet  •  aspirin 81 MG tablet  •  atorvastatin (LIPITOR) 40 mg tablet  •  Biotin w/ Vitamins C & E (HAIR/SKIN/NAILS PO)  •  Calcium 500 MG tablet  •  Cholecalciferol (Vitamin D-3) 25 MCG (1000 UT) CAPS  •  cholestyramine (QUESTRAN) 4 g packet  •  Co-Enzyme Q10 200 MG CAPS  •  cyanocobalamin (VITAMIN B-12) 100 MCG tablet  •  Dapagliflozin Propanediol 10 MG TABS  •  dicyclomine (BENTYL) 10 mg capsule  •  Flaxseed, Linseed, (Flax Seed Oil) 1000 MG CAPS  •  gabapentin (NEURONTIN) 100 mg capsule  •  indapamide (LOZOL) 1 25 mg tablet  •  JANUVIA 100 MG tablet  •  Magnesium Citrate 100 MG TABS  •  metoprolol succinate (TOPROL-XL) 25 mg 24 hr tablet  •  Misc Natural Products (FIBER 7 PO)  •  Multiple Vitamin (MULTI VITAMIN DAILY PO)  •  Multiple Vitamins-Minerals (AIRBORNE PO)  •  Omega-3 Fatty Acids (fish oil) 1,000 mg  •  other medication, see sig,  •  potassium bicarbonate (K-LYTE) 25 MEQ disintegrating tablet  •  rOPINIRole (REQUIP) 2 mg tablet  •  Apoaequorin (PREVAGEN PO)  •  Cinnamon 500 MG capsule  •  Empagliflozin 25 MG TABS  •  hyoscyamine (LEVBID) 0 375 mg 12 hr tablet  •  Lactobacillus-Inulin (CULTURELLE DIGESTIVE HEALTH PO)  •  Turmeric 500 MG CAPS  Allergies   Allergen Reactions   • Hydrocodone-Acetaminophen GI Intolerance   • Wound Dressing Adhesive    • Latex Rash     "redness"     Reviewed medications and allergies and updated as indicated    PHYSICAL EXAM:    Blood pressure 116/60, height 5' 5 5" (1 664 m), weight 79 8 kg (176 lb)  Body mass index is 28 84 kg/m²  General Appearance: NAD, cooperative, alert  Eyes: Anicteric  ENT:  Normocephalic, atraumatic, normal mucosa  Neck:  Supple, symmetrical, trachea midline  Resp:  Clear to auscultation bilaterally; no rales, rhonchi or wheezing; respirations unlabored   CV:  S1 S2, Regular rate and rhythm; no murmur, rub, or gallop  GI:  Soft, non-tender, non-distended; normal bowel sounds; no masses, no organomegaly   Rectal: Deferred  Musculoskeletal: No cyanosis, clubbing or edema  Normal ROM    Skin:  No jaundice, rashes, or lesions   Psych: Normal affect, good eye contact  Neuro: No gross deficits, AAOx3    Lab Results:   Lab Results   Component Value Date    WBC 9 3 09/16/2020    HGB 13 6 09/16/2020    HCT 41 9 09/16/2020    MCV 89 0 09/16/2020     09/16/2020     Lab Results   Component Value Date     11/20/2017    K 4 1 09/16/2020     09/16/2020    CO2 28 09/16/2020    ANIONGAP 4 09/25/2015    BUN 21 09/16/2020    CREATININE 0 87 09/16/2020    GLUCOSE 148 (H) 09/25/2015    CALCIUM 9 2 09/16/2020    AST 20 09/16/2020    ALT 26 09/16/2020    ALKPHOS 61 09/16/2020    PROT 6 8 11/20/2017    BILITOT 0 6 11/20/2017    EGFR 55 11/02/2017

## 2023-01-11 NOTE — LETTER
January 11, 2023     Lynnette Francisco [facility] [address]    Patient: Win Martin Winter   YOB: 1935   Date of Visit: 1/11/2023       Dear Dr Yolis Michael: Thank you for referring Petervinod Esquivel to me for evaluation  Below are my notes for this consultation  If you have questions, please do not hesitate to call me  I look forward to following your patient along with you  Sincerely,        KAYKAY Oliveira        CC: No Recipients  KAYKAY Oliveira  1/11/2023  4:53 PM  Sign when Signing Visit  [id number] - Outpatient Follow-up Note  Win Martin Winter 82 y o  female MRN: [id number]  Encounter: [id number]    ASSESSMENT AND PLAN:      1  Diarrhea  2  Irritable bowel syndrome  Chronic intermittent diarrhea with associated abdominal cramping and urgency  Unable to identify triggering foods  Stress does exacerbate/precipitate symptoms  Recent breath test negative for SIBO  She currently takes Questran twice daily, 1 fiber tablet daily and peppermint oil twice daily  No improvement with dicyclomine  No alarm symptoms but symptoms do affect her lifestyle  Suspect diarrhea predominant IBS but recommend proceeding with colonoscopy to rule out microscopic colitis  If colonoscopy is negative, would consider trial of TCA  -Scheduled for colonoscopy at [facility]  -Continue Questran twice daily  -Peppermint oil 2 to 4 tablets daily  -Pepto-Bismol as needed  -Low-fat diet    3  Screening for colon cancer  Last colonoscopy was in 2010 and was normal   No recall recommended due to age      Followup Appointment: 6 months with MD  ______________________________________________________________________    Chief Complaint   Patient presents with   • follow up     HPI: Presents in follow-up today for persistent intermittent episodes of diarrhea which has been a chronic problem for at least 10 years      She experiences acute watery diarrhea with associated abdominal cramping and urgency  Usually occurs in early a m  and resolves later in the day  Unable to identify food triggers  Some increase in episodes noted with anxiety  Also reports excessive flatus and occasional abdominal bloating  Denies melena or rectal bleeding  She reports that she wears depends as she is afraid of incontinence  Avoids scheduling appointments or activity in a m  because of increased symptoms in the a m  Appetite is good, no nausea or vomiting and she denies unintentional weight loss  Denies GERD symptoms    She currently takes Questran twice daily and 1 fiber tablet daily  She is taking IBgard twice daily which has helped with abdominal bloating and gas  She tried taking dicyclomine with no improvement in her symptoms  Breath test 12/2022 was negative for SIBO  Last colonoscopy was in 2010 and was normal      Historical Information   Past Medical History:   Diagnosis Date   • Acquired breast deformity     last assessed 10/16/14   • Aromatase inhibitor use    • Blood clot in vein    • CAD (coronary artery disease)    • Cataract     OD   • Diabetes mellitus ([address] )    • Difficulty walking up stairs    • Fibrocystic disease of breast    • Full dentures    • H/O tamoxifen therapy    • Heart palpitations    • Hemorrhoids    • Hormone replacement therapy     6 years, estrace, estractest   • Hx of radiation therapy    • Hyperlipidemia    • Hypertension    • Limb alert care status     LEFT   • Vision problem    • Wears glasses      Past Surgical History:   Procedure Laterality Date   • BREAST BIOPSY Left    • BREAST LUMPECTOMY Left    • BREAST SURGERY     • CARDIAC PACEMAKER PLACEMENT     • CATARACT EXTRACTION W/  INTRAOCULAR LENS IMPLANT Left    • CHOLECYSTECTOMY     • COLONOSCOPY  03/18/2010     sigmoid diverticulosis and internal hemorrhoids     • HERNIA REPAIR     • HYSTERECTOMY     • JOINT REPLACEMENT      fito knee replacements   • MASTECTOMY Left 10/07/2014    w/SLN bx   • [place] XCAPSL CTRC RMVL INSJ IO LENS PROSTH W/O ECP Right 11/16/2016    Procedure: EXTRACTION EXTRACAPSULAR CATARACT PHACO INTRAOCULAR LENS (IOL);   Surgeon: Sydnee Rudd MD;  Location:  MAIN OR;  Service: Ophthalmology   • REDUCTION MAMMAPLASTY Right 10/07/2014   • SENTINEL LYMPH NODE BIOPSY Left    • TONSILLECTOMY     • TUBAL LIGATION     • VAGINAL DELIVERY      X 4     Social History     Substance and Sexual Activity   Alcohol Use No     Social History     Substance and Sexual Activity   Drug Use No     Social History     Tobacco Use   Smoking Status Never   Smokeless Tobacco Never     Family History   Problem Relation Age of Onset   • No Known Problems Mother    • Dementia Father    • Cancer Paternal Aunt         larynx   • Stomach cancer Paternal Aunt         dx age 76s   • Cancer Paternal Aunt         leg,age unkown   • Heart disease Family         cardiac   • Diabetes Family    • Substance Abuse Neg Hx    • Colon cancer Neg Hx    • Colon polyps Neg Hx          Current Outpatient Medications:   •  Apple Cider Vinegar 500 MG TABS  •  ascorbic acid (VITAMIN C) 250 mg tablet  •  aspirin 81 MG tablet  •  atorvastatin (LIPITOR) 40 mg tablet  •  Biotin w/ Vitamins C & E (HAIR/SKIN/NAILS PO)  •  Calcium 500 MG tablet  •  Cholecalciferol (Vitamin D-3) 25 MCG (1000 UT) CAPS  •  cholestyramine (QUESTRAN) 4 g packet  •  Co-Enzyme Q10 200 MG CAPS  •  cyanocobalamin (VITAMIN B-12) 100 MCG tablet  •  Dapagliflozin Propanediol 10 MG TABS  •  dicyclomine (BENTYL) 10 mg capsule  •  Flaxseed, Linseed, (Flax Seed Oil) 1000 MG CAPS  •  gabapentin (NEURONTIN) 100 mg capsule  •  indapamide (LOZOL) 1 25 mg tablet  •  JANUVIA 100 MG tablet  •  Magnesium Citrate 100 MG TABS  •  metoprolol succinate (TOPROL-XL) 25 mg 24 hr tablet  •  Misc Natural Products (FIBER 7 PO)  •  Multiple Vitamin (MULTI VITAMIN DAILY PO)  •  Multiple Vitamins-Minerals (AIRBORNE PO)  •  Omega-3 Fatty Acids (fish oil) 1,000 mg  •  other medication, see sig,  •  potassium bicarbonate (K-LYTE) 25 MEQ disintegrating tablet  •  rOPINIRole (REQUIP) 2 mg tablet  •  Apoaequorin (PREVAGEN PO)  •  Cinnamon 500 MG capsule  •  Empagliflozin 25 MG TABS  •  hyoscyamine (LEVBID) 0 375 mg 12 hr tablet  •  Lactobacillus-Inulin (Knimbus PO)  •  Turmeric 500 MG CAPS  Allergies   Allergen Reactions   • Hydrocodone-Acetaminophen GI Intolerance   • Wound Dressing Adhesive    • Latex Rash     "redness"     Reviewed medications and allergies and updated as indicated    PHYSICAL EXAM:    Blood pressure 116/60, height 5' 5 5" (1 664 m), weight 79 8 kg (176 lb)  Body mass index is 28 84 kg/m²  General Appearance: NAD, cooperative, alert  Eyes: Anicteric  ENT:  Normocephalic, atraumatic, normal mucosa  Neck:  Supple, symmetrical, trachea midline  Resp:  Clear to auscultation bilaterally; no rales, rhonchi or wheezing; respirations unlabored   CV:  S1 S2, Regular rate and rhythm; no murmur, rub, or gallop  GI:  Soft, non-tender, non-distended; normal bowel sounds; no masses, no organomegaly   Rectal: Deferred  Musculoskeletal: No cyanosis, clubbing or edema  Normal ROM    Skin:  No jaundice, rashes, or lesions   Psych: Normal affect, good eye contact  Neuro: No gross deficits, AAOx3    Lab Results:   Lab Results   Component Value Date    WBC 9 3 09/16/2020    HGB 13 6 09/16/2020    HCT 41 9 09/16/2020    MCV 89 0 09/16/2020     09/16/2020     Lab Results   Component Value Date     11/20/2017    K 4 1 09/16/2020     09/16/2020    CO2 28 09/16/2020    ANIONGAP 4 09/25/2015    BUN 21 09/16/2020    CREATININE 0 87 09/16/2020    GLUCOSE 148 (H) 09/25/2015    CALCIUM 9 2 09/16/2020    AST 20 09/16/2020    ALT 26 09/16/2020    ALKPHOS 61 09/16/2020    PROT 6 8 11/20/2017    BILITOT 0 6 11/20/2017    EGFR 55 11/02/2017

## 2023-01-11 NOTE — TELEPHONE ENCOUNTER
Scheduled date of colonoscopy (as of today):1/25/23  Physician performing colonoscopy:Dr Michelle Chavez  Location of colonoscopy:[facility]  Bowel prep reviewed with patient: Clenpiq, sample given  Instructions reviewed with patient by: gave instruction packet  Clearances: N

## 2023-01-25 ENCOUNTER — HOSPITAL ENCOUNTER (OUTPATIENT)
Dept: GASTROENTEROLOGY | Facility: AMBULATORY SURGERY CENTER | Age: 88
Discharge: HOME | End: 2023-01-25

## 2023-01-25 ENCOUNTER — ANESTHESIA (OUTPATIENT)
Dept: GASTROENTEROLOGY | Facility: AMBULATORY SURGERY CENTER | Age: 88
End: 2023-01-25

## 2023-01-25 ENCOUNTER — ANESTHESIA EVENT (OUTPATIENT)
Dept: GASTROENTEROLOGY | Facility: AMBULATORY SURGERY CENTER | Age: 88
End: 2023-01-25

## 2023-01-25 VITALS
WEIGHT: 176 LBS | RESPIRATION RATE: 16 BRPM | OXYGEN SATURATION: 97 % | BODY MASS INDEX: 29.32 KG/M2 | TEMPERATURE: 99 F | HEART RATE: 73 BPM | DIASTOLIC BLOOD PRESSURE: 56 MMHG | HEIGHT: 65 IN | SYSTOLIC BLOOD PRESSURE: 118 MMHG

## 2023-01-25 DIAGNOSIS — R19.7 DIARRHEA, UNSPECIFIED TYPE: ICD-10-CM

## 2023-01-25 PROBLEM — Z95.0 PACEMAKER: Status: ACTIVE | Noted: 2023-01-25

## 2023-01-25 RX ORDER — SODIUM CHLORIDE, SODIUM LACTATE, POTASSIUM CHLORIDE, CALCIUM CHLORIDE 600; 310; 30; 20 MG/100ML; MG/100ML; MG/100ML; MG/100ML
50 INJECTION, SOLUTION INTRAVENOUS CONTINUOUS
Status: DISCONTINUED | OUTPATIENT
Start: 2023-01-25 | End: 2023-01-29 | Stop reason: HOSPADM

## 2023-01-25 RX ORDER — PROPOFOL 10 MG/ML
INJECTION, EMULSION INTRAVENOUS AS NEEDED
Status: DISCONTINUED | OUTPATIENT
Start: 2023-01-25 | End: 2023-01-25

## 2023-01-25 RX ORDER — EPHEDRINE SULFATE 50 MG/ML
INJECTION INTRAVENOUS AS NEEDED
Status: DISCONTINUED | OUTPATIENT
Start: 2023-01-25 | End: 2023-01-25

## 2023-01-25 RX ORDER — LIDOCAINE HYDROCHLORIDE 10 MG/ML
INJECTION, SOLUTION EPIDURAL; INFILTRATION; INTRACAUDAL; PERINEURAL AS NEEDED
Status: DISCONTINUED | OUTPATIENT
Start: 2023-01-25 | End: 2023-01-25

## 2023-01-25 RX ADMIN — EPHEDRINE SULFATE 10 MG: 50 INJECTION INTRAVENOUS at 12:50

## 2023-01-25 RX ADMIN — LIDOCAINE HYDROCHLORIDE 40 MG: 10 INJECTION, SOLUTION EPIDURAL; INFILTRATION; INTRACAUDAL; PERINEURAL at 12:27

## 2023-01-25 RX ADMIN — PROPOFOL 20 MG: 10 INJECTION, EMULSION INTRAVENOUS at 12:42

## 2023-01-25 RX ADMIN — PROPOFOL 20 MG: 10 INJECTION, EMULSION INTRAVENOUS at 12:38

## 2023-01-25 RX ADMIN — PROPOFOL 50 MG: 10 INJECTION, EMULSION INTRAVENOUS at 12:27

## 2023-01-25 RX ADMIN — PROPOFOL 20 MG: 10 INJECTION, EMULSION INTRAVENOUS at 12:46

## 2023-01-25 RX ADMIN — PROPOFOL 20 MG: 10 INJECTION, EMULSION INTRAVENOUS at 12:50

## 2023-01-25 RX ADMIN — PROPOFOL 20 MG: 10 INJECTION, EMULSION INTRAVENOUS at 12:35

## 2023-01-25 RX ADMIN — PROPOFOL 30 MG: 10 INJECTION, EMULSION INTRAVENOUS at 12:54

## 2023-01-25 RX ADMIN — PROPOFOL 20 MG: 10 INJECTION, EMULSION INTRAVENOUS at 12:30

## 2023-01-25 RX ADMIN — SODIUM CHLORIDE, SODIUM LACTATE, POTASSIUM CHLORIDE, CALCIUM CHLORIDE 50 ML/HR: 600; 310; 30; 20 INJECTION, SOLUTION INTRAVENOUS at 11:33

## 2023-01-25 NOTE — ANESTHESIA POSTPROCEDURE EVALUATION
Post-Op Assessment Note    CV Status:  Stable    Pain management: adequate     Mental Status:  Sleepy   Hydration Status:  Euvolemic   PONV Controlled:  Controlled   Airway Patency:  Patent      Post Op Vitals Reviewed: Yes      Staff: Anesthesiologist, CRNA         No notable events documented      /50 (01/25/23 1257)    Temp      Pulse 70 (01/25/23 1257)   Resp 16 (01/25/23 1257)    SpO2 100 % (01/25/23 1257)

## 2023-01-25 NOTE — H&P
History and Physical - [facility]  Hector Henao 82 y o  female MRN: [id number]    HPI: Yamilet Torres is a 82y o  year old female who presents for colonoscopy  Patient does have history of watery diarrhea  Last colonoscopy was in 2010    REVIEW OF SYSTEMS: Per the HPI, and otherwise unremarkable  Historical Information   Past Medical History:   Diagnosis Date   • Acquired breast deformity     last assessed 10/16/14   • Aromatase inhibitor use    • Blood clot in vein    • CAD (coronary artery disease)    • Cancer (HCC)    • Cataract     OD   • Diabetes mellitus ([address] )    • Difficulty walking up stairs    • Fibrocystic disease of breast    • Full dentures    • H/O tamoxifen therapy    • Heart palpitations    • Hemorrhoids    • Hormone replacement therapy     6 years, estrace, estractest   • Hx of radiation therapy    • Hyperlipidemia    • Hypertension    • Limb alert care status     LEFT   • Vision problem    • Wears glasses      Past Surgical History:   Procedure Laterality Date   • BREAST BIOPSY Left    • BREAST LUMPECTOMY Left    • BREAST SURGERY     • CARDIAC PACEMAKER PLACEMENT     • CATARACT EXTRACTION W/  INTRAOCULAR LENS IMPLANT Left    • CHOLECYSTECTOMY     • COLONOSCOPY  03/18/2010     sigmoid diverticulosis and internal hemorrhoids  • HERNIA REPAIR     • HYSTERECTOMY     • JOINT REPLACEMENT      fito knee replacements   • MASTECTOMY Left 10/07/2014    w/SLN bx   • [place] XCAPSL CTRC RMVL INSJ IO LENS PROSTH W/O ECP Right 11/16/2016    Procedure: EXTRACTION EXTRACAPSULAR CATARACT PHACO INTRAOCULAR LENS (IOL);   Surgeon: Rico Shah MD;  Location: [facility] OR;  Service: Ophthalmology   • REDUCTION MAMMAPLASTY Right 10/07/2014   • SENTINEL LYMPH NODE BIOPSY Left    • TONSILLECTOMY     • TUBAL LIGATION     • VAGINAL DELIVERY      X 4     Social History   Social History     Substance and Sexual Activity   Alcohol Use No     Social History     Substance and Sexual Activity   Drug Use No Social History     Tobacco Use   Smoking Status Never   Smokeless Tobacco Never     Family History   Problem Relation Age of Onset   • No Known Problems Mother    • Dementia Father    • Cancer Paternal Aunt         larynx   • Stomach cancer Paternal Aunt         dx age 76s   • Cancer Paternal Aunt         leg,age unkown   • Heart disease Family         cardiac   • Diabetes Family    • Substance Abuse Neg Hx    • Colon cancer Neg Hx    • Colon polyps Neg Hx        Meds/Allergies       Current Outpatient Medications:   •  Apple Cider Vinegar 500 MG TABS  •  ascorbic acid (VITAMIN C) 250 mg tablet  •  aspirin 81 MG tablet  •  atorvastatin (LIPITOR) 40 mg tablet  •  Biotin w/ Vitamins C & E (HAIR/SKIN/NAILS PO)  •  Calcium 500 MG tablet  •  Cholecalciferol (Vitamin D-3) 25 MCG (1000 UT) CAPS  •  cholestyramine (QUESTRAN) 4 g packet  •  Co-Enzyme Q10 200 MG CAPS  •  cyanocobalamin (VITAMIN B-12) 100 MCG tablet  •  Flaxseed, Linseed, (Flax Seed Oil) 1000 MG CAPS  •  gabapentin (NEURONTIN) 100 mg capsule  •  indapamide (LOZOL) 1 25 mg tablet  •  JANUVIA 100 MG tablet  •  Magnesium Citrate 100 MG TABS  •  metoprolol succinate (TOPROL-XL) 25 mg 24 hr tablet  •  Misc Natural Products (FIBER 7 PO)  •  Multiple Vitamin (MULTI VITAMIN DAILY PO)  •  Multiple Vitamins-Minerals (AIRBORNE PO)  •  Omega-3 Fatty Acids (fish oil) 1,000 mg  •  potassium bicarbonate (K-LYTE) 25 MEQ disintegrating tablet  •  rOPINIRole (REQUIP) 2 mg tablet  •  Apoaequorin (PREVAGEN PO)  •  Cinnamon 500 MG capsule  •  Dapagliflozin Propanediol 10 MG TABS  •  Empagliflozin 25 MG TABS  •  hyoscyamine (LEVBID) 0 375 mg 12 hr tablet  •  Lactobacillus-Inulin (CULTUREE DIGESTIVE HEALTH PO)  •  other medication, see sig,  •  Turmeric 500 MG CAPS    Current Facility-Administered Medications:   •  lactated ringers infusion, 50 mL/hr, Intravenous, Continuous, Continue from Pre-op at 01/25/23 1138    Allergies   Allergen Reactions   • Hydrocodone-Acetaminophen GI Intolerance   • Wound Dressing Adhesive    • Latex Rash     "redness"       Objective     /64   Pulse 72   Temp 99 °F (37 2 °C) (Temporal)   Resp 16   Ht 5' 5" (1 651 m)   Wt 79 8 kg (176 lb)   SpO2 97%   BMI 29 29 kg/m²     PHYSICAL EXAM    Gen: NAD AAOx3  Head: Normocephalic, Atraumatic  CV: S1S2 RRR no m/r/g  CHEST: Clear b/l no c/r/w  ABD: soft, +BS NT/ND  EXT: no edema    ASSESSMENT/PLAN:  This is a 82y o  year old female here for agnostic colonoscopy, and she is stable and optimized for her procedure

## 2023-01-25 NOTE — ANESTHESIA PREPROCEDURE EVALUATION
Procedure:  COLONOSCOPY    Relevant Problems   CARDIO   (+) Hyperlipidemia associated with type 2 diabetes mellitus (HCC)   (+) Hypertension   (+) PVC's (premature ventricular contractions)   (+) Pacemaker      ENDO   (+) [facility] )      GI/HEPATIC   (+) Gastroesophageal reflux disease      GYN   (+) Breast cancer (HCC)      NEURO/PSYCH   (+) Diabetic polyneuropathy associated with type 2 diabetes mellitus ([facility] )      28/3/24 Systolic function was normal  Ejection fraction was estimated to be 60 %  Although no diagnostic regional wall motion abnormality was identified, this possibility cannot be completely excluded on the basis of this study  Wall thickness was mildly to moderately increased  There was mild to moderate concentric hypertrophy  Doppler parameters were consistent with abnormal left ventricular relaxation (grade 1 diastolic dysfunction)      MITRAL VALVE:  There was mild annular calcification  There was trace regurgitation      TRICUSPID VALVE:  There was mild regurgitation  Pulmonary artery systolic pressure was within the normal range  Physical Exam    Airway    Mallampati score: II  TM Distance: >3 FB  Neck ROM: full     Dental       Cardiovascular      Pulmonary      Other Findings        Anesthesia Plan  ASA Score- 3     Anesthesia Type- IV sedation with anesthesia with ASA Monitors  Additional Monitors:   Airway Plan:           Plan Factors-Exercise tolerance (METS): >4 METS  Chart reviewed  Patient summary reviewed  Patient is not a current smoker  Induction- intravenous  Postoperative Plan-     Informed Consent- Anesthetic plan and risks discussed with patient  I personally reviewed this patient with the CRNA  Discussed and agreed on the Anesthesia Plan with the CRNA  Mira Paniagua

## 2023-01-27 ENCOUNTER — TELEPHONE (OUTPATIENT)
Dept: GASTROENTEROLOGY | Facility: CLINIC | Age: 88
End: 2023-01-27

## 2023-01-27 NOTE — RESULT ENCOUNTER NOTE
Reviewed pathology results with patient  No microscopic colitis noted  She did have small adenoma  No recall colonoscopy

## 2023-01-27 NOTE — TELEPHONE ENCOUNTER
I called the patient and reviewed her pathology results  No microscopic colitis  She did have a small adenoma  No recall colonoscopy  Patient still with issues with diarrhea  I told her to take 2 Imodium tablets 2 mg each before breakfast   Could repeat before dinner if still with diarrhea  Patient is moving back to [facility] in the next month  I like to see her before she moves  Please put her on the waiting list to see me--for any cancellation    Thank you so much

## 2023-02-06 ENCOUNTER — OFFICE VISIT (OUTPATIENT)
Dept: GASTROENTEROLOGY | Facility: CLINIC | Age: 88
End: 2023-02-06

## 2023-02-06 VITALS
WEIGHT: 174.4 LBS | DIASTOLIC BLOOD PRESSURE: 60 MMHG | HEIGHT: 65 IN | BODY MASS INDEX: 29.06 KG/M2 | SYSTOLIC BLOOD PRESSURE: 129 MMHG

## 2023-02-06 DIAGNOSIS — R25.2 LEG CRAMPS: ICD-10-CM

## 2023-02-06 DIAGNOSIS — E11.9 CONTROLLED TYPE 2 DIABETES MELLITUS WITHOUT COMPLICATION, WITHOUT LONG-TERM CURRENT USE OF INSULIN (HCC): ICD-10-CM

## 2023-02-06 DIAGNOSIS — R19.7 DIARRHEA, UNSPECIFIED TYPE: Primary | ICD-10-CM

## 2023-02-06 RX ORDER — LOPERAMIDE HYDROCHLORIDE 2 MG/1
4 CAPSULE ORAL
Qty: 360 CAPSULE | Refills: 3 | Status: SHIPPED | OUTPATIENT
Start: 2023-02-06 | End: 2023-05-07

## 2023-02-06 NOTE — LETTER
February 6, 2023     [facility] [address] [facility] [place] [address]    Patient: Imani Henao   YOB: 1935   Date of Visit: 2/6/2023       Dear Dr Martell Palacio: Thank you for referring Kerline Hall to me for evaluation  Below are my notes for this consultation  If you have questions, please do not hesitate to call me  I look forward to following your patient along with you  Sincerely,        Zarina Graves MD        CC: No Recipients  Zarina Graves MD  2/6/2023  5:19 PM  Incomplete  [facility] - Outpatient Follow-up Note  Imani Henao 82 y o  female MRN: [id number]  Encounter: [id number]    ASSESSMENT AND PLAN:      1  Diarrhea, unspecified type  ---Patient with a recent history of increasing issues with diarrhea  Had been previously controlled with cholestyramine  -Since the Stefany holiday patient did have some type of viral illness or cold and then really had lot of issues with diarrhea subsequently   -Anoscopy and right colon biopsies were negative for microscopic colitis  No neoplasm noted  No inflammation noted  Treat symptomatically with loperamide  Patient may do just fine with milligrams or 1 tablet twice a day before meals  Monitor symptoms  Note patient had a recent negative breath test rule out general overgrowth  - loperamide (IMODIUM) 2 mg capsule; Take 2 capsules (4 mg total) by mouth 2 (two) times a day before meals  Dispense: 360 capsule; Refill: 3    2  Leg cramps  --She does have some restless legs and intermittent leg cramping  She does take supplemental magnesium  This probably could aggravate the diarrhea  She might try to cut back on the magnesium to once per day    3  Controlled type 2 diabetes mellitus without complication, without long-term current use of insulin ([facility])  --Stable at this time  Somewhat increased sugars after her recent viral illness        Followup Appointment: PRN ______________________________________________________________________    Chief Complaint   Patient presents with   • Follow up colonoscopy     HPI:    The patient returns to the office for follow-up after her recent colonoscopy  Patient had been having frequent loose bowel movements from around the time of the Stefany holiday  She has had some problems with chronic diarrhea in the past treated with cholestyramine  Previously her last colonoscopy had been in 2010  She did not have issues with blood per rectum or weight loss  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  Patient also had increasing bloating and flatulence  Seen the patient's diabetic a breath test was performed however this was negative  Because of the change in bowel habit and persistence of the same patient underwent colonoscopy in late January  Note was made of left-sided diverticulosis  She had 1 small adenoma  There was no colitis noted visibly  Biopsies were negative for microscopic colitis  Post discharge from the endoscopy unit patient was advised to start Imodium 2 mg 2 tablets 1-2 times per day  She has been doing 1 tablet twice a day with very good results  This is pretty much eliminated her diarrhea      Historical Information   Past Medical History:   Diagnosis Date   • Acquired breast deformity     last assessed 10/16/14   • Aromatase inhibitor use    • Blood clot in vein    • CAD (coronary artery disease)    • Cancer (HCC)    • Cataract     OD   • Diabetes mellitus ([address] )    • Difficulty walking up stairs    • Fibrocystic disease of breast    • Full dentures    • H/O tamoxifen therapy    • Heart palpitations    • Hemorrhoids    • Hormone replacement therapy     6 years, estrace, estractest   • Hx of radiation therapy    • Hyperlipidemia    • Hypertension    • Limb alert care status     LEFT   • Vision problem    • Wears glasses      Past Surgical History:   Procedure Laterality Date   • BREAST BIOPSY Left    • BREAST LUMPECTOMY Left    • BREAST SURGERY     • CARDIAC PACEMAKER PLACEMENT     • CATARACT EXTRACTION W/  INTRAOCULAR LENS IMPLANT Left    • CHOLECYSTECTOMY     • COLONOSCOPY  03/18/2010     sigmoid diverticulosis and internal hemorrhoids  • HERNIA REPAIR     • HYSTERECTOMY     • JOINT REPLACEMENT      fito knee replacements   • MASTECTOMY Left 10/07/2014    w/SLN bx   • SD XCAPSL CTRC RMVL INSJ IO LENS PROSTH W/O ECP Right 11/16/2016    Procedure: EXTRACTION EXTRACAPSULAR CATARACT PHACO INTRAOCULAR LENS (IOL);   Surgeon: Leonel Mathews MD;  Location: [facility];  Service: Ophthalmology   • REDUCTION MAMMAPLASTY Right 10/07/2014   • SENTINEL LYMPH NODE BIOPSY Left    • TONSILLECTOMY     • TUBAL LIGATION     • VAGINAL DELIVERY      X 4     Social History     Substance and Sexual Activity   Alcohol Use No     Social History     Substance and Sexual Activity   Drug Use No     Social History     Tobacco Use   Smoking Status Never   Smokeless Tobacco Never     Family History   Problem Relation Age of Onset   • No Known Problems Mother    • Dementia Father    • Cancer Paternal Aunt         larynx   • Stomach cancer Paternal Aunt         dx age 76s   • Cancer Paternal Aunt         leg,age unkown   • Heart disease Family         cardiac   • Diabetes Family    • Substance Abuse Neg Hx    • Colon cancer Neg Hx    • Colon polyps Neg Hx          Current Outpatient Medications:   •  Apple Cider Vinegar 500 MG TABS  •  ascorbic acid (VITAMIN C) 250 mg tablet  •  aspirin 81 MG tablet  •  atorvastatin (LIPITOR) 40 mg tablet  •  Biotin w/ Vitamins C & E (HAIR/SKIN/NAILS PO)  •  Calcium 500 MG tablet  •  Cholecalciferol (Vitamin D-3) 25 MCG (1000 UT) CAPS  •  cholestyramine (QUESTRAN) 4 g packet  •  Co-Enzyme Q10 200 MG CAPS  •  cyanocobalamin (VITAMIN B-12) 100 MCG tablet  •  Empagliflozin 25 MG TABS  •  Flaxseed, Linseed, (Flax Seed Oil) 1000 MG CAPS  •  gabapentin (NEURONTIN) 100 mg capsule  •  indapamide (LOZOL) 1 25 mg tablet  •  JANUVIA 100 MG tablet  •  Lactobacillus-Inulin ([employer] [facility] PO)  •  loperamide (IMODIUM) 2 mg capsule  •  Magnesium Citrate 100 MG TABS  •  metoprolol succinate (TOPROL-XL) 25 mg 24 hr tablet  •  Misc Natural Products (FIBER 7 PO)  •  Multiple Vitamin (MULTI VITAMIN DAILY PO)  •  Multiple Vitamins-Minerals (AIRBORNE PO)  •  Omega-3 Fatty Acids (fish oil) 1,000 mg  •  other medication, see sig,  •  potassium bicarbonate (K-LYTE) 25 MEQ disintegrating tablet  •  rOPINIRole (REQUIP) 2 mg tablet  •  Apoaequorin (PREVAGEN PO)  •  Cinnamon 500 MG capsule  •  Dapagliflozin Propanediol 10 MG TABS  •  Turmeric 500 MG CAPS  Allergies   Allergen Reactions   • Hydrocodone-Acetaminophen GI Intolerance   • Wound Dressing Adhesive    • Latex Rash     "redness"     Reviewed medications and allergies and updated as indicated    PHYSICAL EXAM:    Blood pressure 129/60, height 5' 5" (1 651 m), weight 79 1 kg (174 lb 6 4 oz)  Body mass index is 29 02 kg/m²  General Appearance: NAD, cooperative, alert  Eyes: Anicteric, conjunctiva pink  ENT:  Normocephalic, atraumatic, normal mucosa  Neck:  Supple, symmetrical, trachea midline  Resp:  Clear to auscultation bilaterally; no rales, rhonchi or wheezing; respirations unlabored   CV:  S1 S2, Regular rate and rhythm; no murmur, rub, or gallop  GI:  Soft, non-tender, non-distended; normal bowel sounds; no masses, no organomegaly   Rectal: Deferred  Musculoskeletal: No cyanosis, clubbing or edema  Normal ROM  Skin:  No jaundice, rashes, or lesions   Heme/Lymph: No palpable cervical lymphadenopathy  Psych: Normal affect, good eye contact  Neuro: No gross deficits, AAOx3        Elio Santillan MD  2/6/2023  5:14 PM  Sign when Signing Visit  [facility] - Outpatient Follow-up Note  Amelia Menjivar Winter 82 y o  female MRN: [id number]  Encounter: [id number]    ASSESSMENT AND PLAN:      1   Diarrhea, unspecified type  ---Patient with a recent history of increasing issues with diarrhea  Had been previously controlled with cholestyramine  -Since the Stef[place] holiday patient did have some type of viral illness or cold and then really had lot of issues with diarrhea subsequently   -Anoscopy and right colon biopsies were negative for microscopic colitis  No neoplasm noted  No inflammation noted  Treat symptomatically with loperamide  Patient may do just fine with milligrams or 1 tablet twice a day before meals  Monitor symptoms  Note patient had a recent negative breath test rule out general overgrowth  - loperamide (IMODIUM) 2 mg capsule; Take 2 capsules (4 mg total) by mouth 2 (two) times a day before meals  Dispense: 360 capsule; Refill: 3    2  Leg cramps  --She does have some restless legs and intermittent leg cramping  She does take supplemental magnesium  This probably could aggravate the diarrhea  She might try to cut back on the magnesium to once per day    3  Controlled type 2 diabetes mellitus without complication, without long-term current use of insulin ([facility])  --Stable at this time  Somewhat increased sugars after her recent viral illness        Followup Appointment: PRN   ______________________________________________________________________    Chief Complaint   Patient presents with   • Follow up colonoscopy     HPI:  ***    Historical Information   Past Medical History:   Diagnosis Date   • Acquired breast deformity     last assessed 10/16/14   • Aromatase inhibitor use    • Blood clot in vein    • CAD (coronary artery disease)    • Cancer (HCC)    • Cataract     OD   • Diabetes mellitus ([facility] [address] )    • Difficulty walking up stairs    • Fibrocystic disease of breast    • Full dentures    • H/O tamoxifen therapy    • Heart palpitations    • Hemorrhoids    • Hormone replacement therapy     6 years, estrace, estractest   • Hx of radiation therapy    • Hyperlipidemia    • Hypertension    • Limb alert care status     LEFT   • Vision problem • Wears glasses      Past Surgical History:   Procedure Laterality Date   • BREAST BIOPSY Left    • BREAST LUMPECTOMY Left    • BREAST SURGERY     • CARDIAC PACEMAKER PLACEMENT     • CATARACT EXTRACTION W/  INTRAOCULAR LENS IMPLANT Left    • CHOLECYSTECTOMY     • COLONOSCOPY  03/18/2010     sigmoid diverticulosis and internal hemorrhoids  • HERNIA REPAIR     • HYSTERECTOMY     • JOINT REPLACEMENT      fito knee replacements   • MASTECTOMY Left 10/07/2014    w/SLN bx   • MT XCAPSL CTRC RMVL INSJ IO LENS PROSTH W/O ECP Right 11/16/2016    Procedure: EXTRACTION EXTRACAPSULAR CATARACT PHACO INTRAOCULAR LENS (IOL);   Surgeon: Minerva Key MD;  Location:  MAIN OR;  Service: Ophthalmology   • REDUCTION MAMMAPLASTY Right 10/07/2014   • SENTINEL LYMPH NODE BIOPSY Left    • TONSILLECTOMY     • TUBAL LIGATION     • VAGINAL DELIVERY      X 4     Social History     Substance and Sexual Activity   Alcohol Use No     Social History     Substance and Sexual Activity   Drug Use No     Social History     Tobacco Use   Smoking Status Never   Smokeless Tobacco Never     Family History   Problem Relation Age of Onset   • No Known Problems Mother    • Dementia Father    • Cancer Paternal Aunt         larynx   • Stomach cancer Paternal Aunt         dx age 76s   • Cancer Paternal Aunt         leg,age unkown   • Heart disease Family         cardiac   • Diabetes Family    • Substance Abuse Neg Hx    • Colon cancer Neg Hx    • Colon polyps Neg Hx          Current Outpatient Medications:   •  Apple Cider Vinegar 500 MG TABS  •  ascorbic acid (VITAMIN C) 250 mg tablet  •  aspirin 81 MG tablet  •  atorvastatin (LIPITOR) 40 mg tablet  •  Biotin w/ Vitamins C & E (HAIR/SKIN/NAILS PO)  •  Calcium 500 MG tablet  •  Cholecalciferol (Vitamin D-3) 25 MCG (1000 UT) CAPS  •  cholestyramine (QUESTRAN) 4 g packet  •  Co-Enzyme Q10 200 MG CAPS  •  cyanocobalamin (VITAMIN B-12) 100 MCG tablet  •  Empagliflozin 25 MG TABS  •  Flaxseed, Linseed, (Flax Seed Oil) 1000 MG CAPS  •  gabapentin (NEURONTIN) 100 mg capsule  •  indapamide (LOZOL) 1 25 mg tablet  •  JANUVIA 100 MG tablet  •  Lactobacillus-Inulin ([facility] PO)  •  loperamide (IMODIUM) 2 mg capsule  •  Magnesium Citrate 100 MG TABS  •  metoprolol succinate (TOPROL-XL) 25 mg 24 hr tablet  •  Misc Natural Products (FIBER 7 PO)  •  Multiple Vitamin (MULTI VITAMIN DAILY PO)  •  Multiple Vitamins-Minerals (AIRBORNE PO)  •  Omega-3 Fatty Acids (fish oil) 1,000 mg  •  other medication, see sig,  •  potassium bicarbonate (K-LYTE) 25 MEQ disintegrating tablet  •  rOPINIRole (REQUIP) 2 mg tablet  •  Apoaequorin (PREVAGEN PO)  •  Cinnamon 500 MG capsule  •  Dapagliflozin Propanediol 10 MG TABS  •  Turmeric 500 MG CAPS  Allergies   Allergen Reactions   • Hydrocodone-Acetaminophen GI Intolerance   • Wound Dressing Adhesive    • Latex Rash     "redness"     Reviewed medications and allergies and updated as indicated    PHYSICAL EXAM:    Blood pressure 129/60, height 5' 5" (1 651 m), weight 79 1 kg (174 lb 6 4 oz)  Body mass index is 29 02 kg/m²  General Appearance: NAD, cooperative, alert  Eyes: Anicteric, PERRLA, EOMI  ENT:  Normocephalic, atraumatic, normal mucosa  Neck:  Supple, symmetrical, trachea midline  Resp:  Clear to auscultation bilaterally; no rales, rhonchi or wheezing; respirations unlabored   CV:  S1 S2, Regular rate and rhythm; no murmur, rub, or gallop  GI:  Soft, non-tender, non-distended; normal bowel sounds; no masses, no organomegaly   Rectal: Deferred  Musculoskeletal: No cyanosis, clubbing or edema  Normal ROM    Skin:  No jaundice, rashes, or lesions   Heme/Lymph: No palpable cervical lymphadenopathy  Psych: Normal affect, good eye contact  Neuro: No gross deficits, AAOx3    Lab Results:   Lab Results   Component Value Date    WBC 9 3 09/16/2020    HGB 13 6 09/16/2020    HCT 41 9 09/16/2020    MCV 89 0 09/16/2020     09/16/2020     Lab Results   Component Value Date     11/20/2017    K 4 1 09/16/2020     09/16/2020    CO2 28 09/16/2020    ANIONGAP 4 09/25/2015    BUN 21 09/16/2020    CREATININE 0 87 09/16/2020    GLUCOSE 148 (H) 09/25/2015    CALCIUM 9 2 09/16/2020    AST 20 09/16/2020    ALT 26 09/16/2020    ALKPHOS 61 09/16/2020    PROT 6 8 11/20/2017    BILITOT 0 6 11/20/2017    EGFR 55 11/02/2017     No results found for: IRON, TIBC, FERRITIN  No results found for: LIPASE    Radiology Results:   Colonoscopy    Result Date: 1/25/2023  Narrative: Table formatting from the original result was not included  [address] [place] [address] [address] [phone] DATE OF SERVICE: 1/25/23 PHYSICIAN(S): Attending: Altaf Farrar MD Fellow: No Staff Documented INDICATION: Diarrhea, unspecified type POST-OP DIAGNOSIS: See the impression below  HISTORY: Prior colonoscopy: 10 years ago  BOWEL PREPARATION: Clenpiq PREPROCEDURE: Informed consent was obtained for the procedure, including sedation  Risks including but not limited to bleeding, infection, perforation, adverse drug reaction and aspiration were explained in detail  Also explained about less than 100% sensitivity with the exam and other alternatives  The patient was placed in the left lateral decubitus position  Procedure: Colonoscopy DETAILS OF PROCEDURE: Patient was taken to the procedure room where a time out was performed to confirm correct patient and correct procedure  The patient underwent monitored anesthesia care, which was administered by an anesthesia professional  The patient's blood pressure, heart rate, level of consciousness, oxygen and respirations were monitored throughout the procedure  A digital rectal exam was performed  The scope was introduced through the anus and advanced to the cecum  Retroflexion was performed in the rectum   The quality of bowel preparation was evaluated using the [facility] Bowel Preparation Scale with scores of: right colon = 2, transverse colon = 2, left colon = 2  The total BBPS score was 6  Bowel prep was adequate  The patient experienced no blood loss  The procedure was moderately difficult due to angulation  In response to procedure difficulty, counter pressure was applied, the instrument was changed to a pediatric endoscope, reduction was employed and water immersion technique was employed  The patient tolerated the procedure well  There were no apparent complications  ANESTHESIA INFORMATION: ASA: II Anesthesia Type: IV Sedation with Anesthesia MEDICATIONS: lactated ringers infusion 500 mL*  *From user-documented volume (Totals for administrations occurring from 1223 to 1259 on 01/25/23) FINDINGS: Multiple small mild diverticula in the sigmoid colon One 3 mm sessile polyp in the cecum; performed complete en bloc removal by cold forceps biopsy; placed 1 clip successfully (clip is MRI compatible); hemostasis achieved  Some bleeding after taking up a small polyp so occult blood was applied All observed locations appeared normal  Performed 4 random biopsies using biopsy forceps  Biopsies were taken in the right colon to rule out microscopic colitis given patient's history of diarrhea Internal (grade 2) hemorrhoids  Prolapse of rectal mucosal One 4 mm polyp in the descending colon   Very small polyp noted on withdrawal the scope not removed given lack of clinical significance EVENTS: Procedure Events Event Event Time ENDO CECUM REACHED 1/25/2023 12:42 PM ENDO SCOPE OUT TIME 1/25/2023 12:58 PM SPECIMENS: ID Type Source Tests Collected by Time Destination A :  Tissue Colon   1/25/2023 12:58 PM  EQUIPMENT: Colonoscope -[id number]     Impression: Mild sigmoid diverticulosis Normal colonic mucosa throughout biopsies taken right colon to exclude microscopic colitis Small cecal polyp removed with biopsy forcep Small descending colon polyp not removed secondary to lack of clinical significance Grade 2 internal hemorrhoids and some rectal prolapse of mucosa RECOMMENDATION:  No further screening colonoscopies necessary  Age greater than 72  Will call with biopsy results 7 to 14 days In the meantime can take loperamide or Imodium 2 mg 2 tablets in the morning as needed for diarrhea Follow-up in the office as scheduled  Lilly Tesfaye MD

## 2023-02-06 NOTE — PROGRESS NOTES
[id number] [place] [facility] - Outpatient Follow-up Note  [place] Nine Winter 82 y o  female MRN: [id number]  Encounter: [id number]    ASSESSMENT AND PLAN:      1  Diarrhea, unspecified type  ---Patient with a recent history of increasing issues with diarrhea  Had been previously controlled with cholestyramine  -Since the [place] holiday patient did have some type of viral illness or cold and then really had lot of issues with diarrhea subsequently   -Anoscopy and right colon biopsies were negative for microscopic colitis  No neoplasm noted  No inflammation noted  Treat symptomatically with loperamide  Patient may do just fine with milligrams or 1 tablet twice a day before meals  Monitor symptoms  Note patient had a recent negative breath test rule out general overgrowth  - loperamide (IMODIUM) 2 mg capsule; Take 2 capsules (4 mg total) by mouth 2 (two) times a day before meals  Dispense: 360 capsule; Refill: 3    2  Leg cramps  --She does have some restless legs and intermittent leg cramping  She does take supplemental magnesium  This probably could aggravate the diarrhea  She might try to cut back on the magnesium to once per day    3  Controlled type 2 diabetes mellitus without complication, without long-term current use of insulin ([facility])  --Stable at this time  Somewhat increased sugars after her recent viral illness  Followup Appointment: PRN   ______________________________________________________________________    Chief Complaint   Patient presents with   • Follow up colonoscopy     HPI:    The patient returns to the office for follow-up after her recent colonoscopy  Patient had been having frequent loose bowel movements from around the time of the [place] holiday  She has had some problems with chronic diarrhea in the past treated with cholestyramine  Previously her last colonoscopy had been in 2010  She did not have issues with blood per rectum or weight loss  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  Patient also had increasing bloating and flatulence  Seen the patient's diabetic a breath test was performed however this was negative  Because of the change in bowel habit and persistence of the same patient underwent colonoscopy in late January  Note was made of left-sided diverticulosis  She had 1 small adenoma  There was no colitis noted visibly  Biopsies were negative for microscopic colitis  Post discharge from the endoscopy unit patient was advised to start Imodium 2 mg 2 tablets 1-2 times per day  She has been doing 1 tablet twice a day with very good results  This is pretty much eliminated her diarrhea  Historical Information   Past Medical History:   Diagnosis Date   • Acquired breast deformity     last assessed 10/16/14   • Aromatase inhibitor use    • Blood clot in vein    • CAD (coronary artery disease)    • Cancer (HCC)    • Cataract     OD   • Diabetes mellitus ([address] )    • Difficulty walking up stairs    • Fibrocystic disease of breast    • Full dentures    • H/O tamoxifen therapy    • Heart palpitations    • Hemorrhoids    • Hormone replacement therapy     6 years, estrace, estractest   • Hx of radiation therapy    • Hyperlipidemia    • Hypertension    • Limb alert care status     LEFT   • Vision problem    • Wears glasses      Past Surgical History:   Procedure Laterality Date   • BREAST BIOPSY Left    • BREAST LUMPECTOMY Left    • BREAST SURGERY     • CARDIAC PACEMAKER PLACEMENT     • CATARACT EXTRACTION W/  INTRAOCULAR LENS IMPLANT Left    • CHOLECYSTECTOMY     • COLONOSCOPY  03/18/2010     sigmoid diverticulosis and internal hemorrhoids  • HERNIA REPAIR     • HYSTERECTOMY     • JOINT REPLACEMENT      fito knee replacements   • MASTECTOMY Left 10/07/2014    w/SLN bx   • [place] XCAPSL CTRC RMVL INSJ IO LENS PROSTH W/O ECP Right 11/16/2016    Procedure: EXTRACTION EXTRACAPSULAR CATARACT PHACO INTRAOCULAR LENS (IOL);   Surgeon: Emrey Palomo Cyrus Hays MD;  Location:  MAIN OR;  Service: Ophthalmology   • REDUCTION MAMMAPLASTY Right 10/07/2014   • SENTINEL LYMPH NODE BIOPSY Left    • TONSILLECTOMY     • TUBAL LIGATION     • VAGINAL DELIVERY      X 4     Social History     Substance and Sexual Activity   Alcohol Use No     Social History     Substance and Sexual Activity   Drug Use No     Social History     Tobacco Use   Smoking Status Never   Smokeless Tobacco Never     Family History   Problem Relation Age of Onset   • No Known Problems Mother    • Dementia Father    • Cancer Paternal Aunt         larynx   • Stomach cancer Paternal Aunt         dx age 76s   • Cancer Paternal Aunt         leg,age unkown   • Heart disease Family         cardiac   • Diabetes Family    • Substance Abuse Neg Hx    • Colon cancer Neg Hx    • Colon polyps Neg Hx          Current Outpatient Medications:   •  Apple Cider Vinegar 500 MG TABS  •  ascorbic acid (VITAMIN C) 250 mg tablet  •  aspirin 81 MG tablet  •  atorvastatin (LIPITOR) 40 mg tablet  •  Biotin w/ Vitamins C & E (HAIR/SKIN/NAILS PO)  •  Calcium 500 MG tablet  •  Cholecalciferol (Vitamin D-3) 25 MCG (1000 UT) CAPS  •  cholestyramine (QUESTRAN) 4 g packet  •  Co-Enzyme Q10 200 MG CAPS  •  cyanocobalamin (VITAMIN B-12) 100 MCG tablet  •  Empagliflozin 25 MG TABS  •  Flaxseed, Linseed, (Flax Seed Oil) 1000 MG CAPS  •  gabapentin (NEURONTIN) 100 mg capsule  •  indapamide (LOZOL) 1 25 mg tablet  •  JANUVIA 100 MG tablet  •  Lactobacillus-Inulin ([facility] PO)  •  loperamide (IMODIUM) 2 mg capsule  •  Magnesium Citrate 100 MG TABS  •  metoprolol succinate (TOPROL-XL) 25 mg 24 hr tablet  •  Misc Natural Products (FIBER 7 PO)  •  Multiple Vitamin (MULTI VITAMIN DAILY PO)  •  Multiple Vitamins-Minerals (AIRBORNE PO)  •  Omega-3 Fatty Acids (fish oil) 1,000 mg  •  other medication, see sig,  •  potassium bicarbonate (K-LYTE) 25 MEQ disintegrating tablet  •  rOPINIRole (REQUIP) 2 mg tablet  •  Apoaequorin (PREVAGEN PO)  •  Cinnamon 500 MG capsule  •  Dapagliflozin Propanediol 10 MG TABS  •  Turmeric 500 MG CAPS  Allergies   Allergen Reactions   • Hydrocodone-Acetaminophen GI Intolerance   • Wound Dressing Adhesive    • Latex Rash     "redness"     Reviewed medications and allergies and updated as indicated    PHYSICAL EXAM:    Blood pressure 129/60, height 5' 5" (1 651 m), weight 79 1 kg (174 lb 6 4 oz)  Body mass index is 29 02 kg/m²  General Appearance: NAD, cooperative, alert  Eyes: Anicteric, conjunctiva pink  ENT:  Normocephalic, atraumatic, normal mucosa  Neck:  Supple, symmetrical, trachea midline  Resp:  Clear to auscultation bilaterally; no rales, rhonchi or wheezing; respirations unlabored   CV:  S1 S2, Regular rate and rhythm; no murmur, rub, or gallop  GI:  Soft, non-tender, non-distended; normal bowel sounds; no masses, no organomegaly   Rectal: Deferred  Musculoskeletal: No cyanosis, clubbing or edema  Normal ROM    Skin:  No jaundice, rashes, or lesions   Heme/Lymph: No palpable cervical lymphadenopathy  Psych: Normal affect, good eye contact  Neuro: No gross deficits, AAOx3

## 2023-02-06 NOTE — PATIENT INSTRUCTIONS
[id number] - Outpatient Follow-up Note  Gabriela Nine Winter 82 y o  female MRN: [id number]  Encounter: [id number]    ASSESSMENT AND PLAN:      1  Diarrhea, unspecified type  ---Patient with a recent history of increasing issues with diarrhea  Had been previously controlled with cholestyramine  -Since the [place] holiday patient did have some type of viral illness or cold and then really had lot of issues with diarrhea subsequently   -Anoscopy and right colon biopsies were negative for microscopic colitis  No neoplasm noted  No inflammation noted  Treat symptomatically with loperamide  Patient may do just fine with milligrams or 1 tablet twice a day before meals  Monitor symptoms  Note patient had a recent negative breath test rule out general overgrowth  - loperamide (IMODIUM) 2 mg capsule; Take 2 capsules (4 mg total) by mouth 2 (two) times a day before meals  Dispense: 360 capsule; Refill: 3    2  Leg cramps  --She does have some restless legs and intermittent leg cramping  She does take supplemental magnesium  This probably could aggravate the diarrhea  She might try to cut back on the magnesium to once per day    3  Controlled type 2 diabetes mellitus without complication, without long-term current use of insulin ([facility])  --Stable at this time  Somewhat increased sugars after her recent viral illness        Followup Appointment: PRN

## 2023-02-06 NOTE — LETTER
February 6, 2023     [place] D[place] Spear [facility] [id number]    Patient: Manan Jacobs Winter   YOB: 1935   Date of Visit: 2/6/2023       Dear Dr Christelle Wright: Thank you for referring Nina Bermudez to me for evaluation  Below are my notes for this consultation  If you have questions, please do not hesitate to call me  I look forward to following your patient along with you  Sincerely,        Altaf Farrar MD        CC: No Recipients  Altaf Farrar MD  2/6/2023  5:19 PM  Sign when Signing Visit  [facility] - Outpatient Follow-up Note  Manan Jacobs Winter 82 y o  female MRN: [id number]  Encounter: [id number]    ASSESSMENT AND PLAN:      1  Diarrhea, unspecified type  ---Patient with a recent history of increasing issues with diarrhea  Had been previously controlled with cholestyramine  -Since the [place] holiday patient did have some type of viral illness or cold and then really had lot of issues with diarrhea subsequently   -Anoscopy and right colon biopsies were negative for microscopic colitis  No neoplasm noted  No inflammation noted  Treat symptomatically with loperamide  Patient may do just fine with milligrams or 1 tablet twice a day before meals  Monitor symptoms  Note patient had a recent negative breath test rule out general overgrowth  - loperamide (IMODIUM) 2 mg capsule; Take 2 capsules (4 mg total) by mouth 2 (two) times a day before meals  Dispense: 360 capsule; Refill: 3    2  Leg cramps  --She does have some restless legs and intermittent leg cramping  She does take supplemental magnesium  This probably could aggravate the diarrhea  She might try to cut back on the magnesium to once per day    3  Controlled type 2 diabetes mellitus without complication, without long-term current use of insulin ([facility])  --Stable at this time  Somewhat increased sugars after her recent viral illness        Followup Appointment: PRN ______________________________________________________________________    Chief Complaint   Patient presents with   • Follow up colonoscopy     HPI:    The patient returns to the office for follow-up after her recent colonoscopy  Patient had been having frequent loose bowel movements from around the time of the [place] holiday  She has had some problems with chronic diarrhea in the past treated with cholestyramine  Previously her last colonoscopy had been in 2010  She did not have issues with blood per rectum or weight loss  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  Patient also had increasing bloating and flatulence  Seen the patient's diabetic a breath test was performed however this was negative  Because of the change in bowel habit and persistence of the same patient underwent colonoscopy in late January  Note was made of left-sided diverticulosis  She had 1 small adenoma  There was no colitis noted visibly  Biopsies were negative for microscopic colitis  Post discharge from the endoscopy unit patient was advised to start Imodium 2 mg 2 tablets 1-2 times per day  She has been doing 1 tablet twice a day with very good results  This is pretty much eliminated her diarrhea      Historical Information   Past Medical History:   Diagnosis Date   • Acquired breast deformity     last assessed 10/16/14   • Aromatase inhibitor use    • Blood clot in vein    • CAD (coronary artery disease)    • Cancer (HCC)    • Cataract     OD   • Diabetes mellitus ([address] )    • Difficulty walking up stairs    • Fibrocystic disease of breast    • Full dentures    • H/O tamoxifen therapy    • Heart palpitations    • Hemorrhoids    • Hormone replacement therapy     6 years, estrace, estractest   • Hx of radiation therapy    • Hyperlipidemia    • Hypertension    • Limb alert care status     LEFT   • Vision problem    • Wears glasses      Past Surgical History:   Procedure Laterality Date   • BREAST BIOPSY Left    • BREAST LUMPECTOMY Left    • BREAST SURGERY     • CARDIAC PACEMAKER PLACEMENT     • CATARACT EXTRACTION W/  INTRAOCULAR LENS IMPLANT Left    • CHOLECYSTECTOMY     • COLONOSCOPY  03/18/2010     sigmoid diverticulosis and internal hemorrhoids  • HERNIA REPAIR     • HYSTERECTOMY     • JOINT REPLACEMENT      fito knee replacements   • MASTECTOMY Left 10/07/2014    w/SLN bx   • [place] XCAPSL CTRC RMVL INSJ IO LENS PROSTH W/O ECP Right 11/16/2016    Procedure: EXTRACTION EXTRACAPSULAR CATARACT PHACO INTRAOCULAR LENS (IOL);   Surgeon: Ele Charles MD;  Location: [facility];  Service: Ophthalmology   • REDUCTION MAMMAPLASTY Right 10/07/2014   • SENTINEL LYMPH NODE BIOPSY Left    • TONSILLECTOMY     • TUBAL LIGATION     • VAGINAL DELIVERY      X 4     Social History     Substance and Sexual Activity   Alcohol Use No     Social History     Substance and Sexual Activity   Drug Use No     Social History     Tobacco Use   Smoking Status Never   Smokeless Tobacco Never     Family History   Problem Relation Age of Onset   • No Known Problems Mother    • Dementia Father    • Cancer Paternal Aunt         larynx   • Stomach cancer Paternal Aunt         dx age 76s   • Cancer Paternal Aunt         leg,age unkown   • Heart disease Family         cardiac   • Diabetes Family    • Substance Abuse Neg Hx    • Colon cancer Neg Hx    • Colon polyps Neg Hx          Current Outpatient Medications:   •  Apple Cider Vinegar 500 MG TABS  •  ascorbic acid (VITAMIN C) 250 mg tablet  •  aspirin 81 MG tablet  •  atorvastatin (LIPITOR) 40 mg tablet  •  Biotin w/ Vitamins C & E (HAIR/SKIN/NAILS PO)  •  Calcium 500 MG tablet  •  Cholecalciferol (Vitamin D-3) 25 MCG (1000 UT) CAPS  •  cholestyramine (QUESTRAN) 4 g packet  •  Co-Enzyme Q10 200 MG CAPS  •  cyanocobalamin (VITAMIN B-12) 100 MCG tablet  •  Empagliflozin 25 MG TABS  •  Flaxseed, Linseed, (Flax Seed Oil) 1000 MG CAPS  •  gabapentin (NEURONTIN) 100 mg capsule  •  indapamide (LOZOL) 1 25 mg tablet  •  JANUVIA 100 MG tablet  •  Lactobacillus-Inulin ([employer] PO)  •  loperamide (IMODIUM) 2 mg capsule  •  Magnesium Citrate 100 MG TABS  •  metoprolol succinate (TOPROL-XL) 25 mg 24 hr tablet  •  Misc Natural Products (FIBER 7 PO)  •  Multiple Vitamin (MULTI VITAMIN DAILY PO)  •  Multiple Vitamins-Minerals (AIRBORNE PO)  •  Omega-3 Fatty Acids (fish oil) 1,000 mg  •  other medication, see sig,  •  potassium bicarbonate (K-LYTE) 25 MEQ disintegrating tablet  •  rOPINIRole (REQUIP) 2 mg tablet  •  Apoaequorin (PREVAGEN PO)  •  Cinnamon 500 MG capsule  •  Dapagliflozin Propanediol 10 MG TABS  •  Turmeric 500 MG CAPS  Allergies   Allergen Reactions   • Hydrocodone-Acetaminophen GI Intolerance   • Wound Dressing Adhesive    • Latex Rash     "redness"     Reviewed medications and allergies and updated as indicated    PHYSICAL EXAM:    Blood pressure 129/60, height 5' 5" (1 651 m), weight 79 1 kg (174 lb 6 4 oz)  Body mass index is 29 02 kg/m²  General Appearance: NAD, cooperative, alert  Eyes: Anicteric, conjunctiva pink  ENT:  Normocephalic, atraumatic, normal mucosa  Neck:  Supple, symmetrical, trachea midline  Resp:  Clear to auscultation bilaterally; no rales, rhonchi or wheezing; respirations unlabored   CV:  S1 S2, Regular rate and rhythm; no murmur, rub, or gallop  GI:  Soft, non-tender, non-distended; normal bowel sounds; no masses, no organomegaly   Rectal: Deferred  Musculoskeletal: No cyanosis, clubbing or edema  Normal ROM    Skin:  No jaundice, rashes, or lesions   Heme/Lymph: No palpable cervical lymphadenopathy  Psych: Normal affect, good eye contact  Neuro: No gross deficits, AAOx3

## 2023-08-28 ENCOUNTER — OFFICE VISIT (OUTPATIENT)
Dept: GASTROENTEROLOGY | Facility: CLINIC | Age: 88
End: 2023-08-28
Payer: COMMERCIAL

## 2023-08-28 VITALS
DIASTOLIC BLOOD PRESSURE: 59 MMHG | SYSTOLIC BLOOD PRESSURE: 123 MMHG | BODY MASS INDEX: 28.66 KG/M2 | HEIGHT: 65 IN | WEIGHT: 172 LBS

## 2023-08-28 DIAGNOSIS — R19.7 DIARRHEA, UNSPECIFIED TYPE: Primary | ICD-10-CM

## 2023-08-28 DIAGNOSIS — K58.0 IRRITABLE BOWEL SYNDROME WITH DIARRHEA: ICD-10-CM

## 2023-08-28 PROCEDURE — 99213 OFFICE O/P EST LOW 20 MIN: CPT | Performed by: INTERNAL MEDICINE

## 2023-08-28 RX ORDER — CHOLESTYRAMINE 4 G/9G
1 POWDER, FOR SUSPENSION ORAL 2 TIMES DAILY WITH MEALS
Qty: 180 PACKET | Refills: 3 | Status: SHIPPED | OUTPATIENT
Start: 2023-08-28 | End: 2023-11-26

## 2023-08-28 RX ORDER — LOPERAMIDE HYDROCHLORIDE 2 MG/1
2 CAPSULE ORAL
COMMUNITY
End: 2023-08-28 | Stop reason: SDUPTHER

## 2023-08-28 RX ORDER — LOPERAMIDE HYDROCHLORIDE 2 MG/1
4 CAPSULE ORAL
Qty: 360 CAPSULE | Refills: 3 | Status: SHIPPED | OUTPATIENT
Start: 2023-08-28 | End: 2023-11-26

## 2023-08-28 NOTE — PROGRESS NOTES
[facility]  Jaimee Henao 82 y.o. female MRN: [id number]  Encounter: [id number]    ASSESSMENT AND PLAN:      1. Diarrhea, unspecified type  Patient reports diarrhea is under good control. Interestingly she has to take Questran twice a day and Imodium twice a day to keep symptoms under control. Colonoscopy in January and right colon biopsies negative for colitis or microscopic colitis    - cholestyramine (QUESTRAN) 4 g packet; Take 1 packet (4 g total) by mouth 2 (two) times a day with meals  Dispense: 180 packet; Refill: 3  - loperamide (IMODIUM) 2 mg capsule; Take 2 capsules (4 mg total) by mouth 2 (two) times a day before meals Two capsules twice daily  Dispense: 360 capsule; Refill: 3    -Does have occasional breakthrough symptoms. Does have occasional constipation and will hold the dose of Imodium    2. Irritable bowel syndrome with diarrhea  --seems to have resolved. No abdominal pain at this time      Followup Appointment: 1 year   ______________________________________________________________________    Chief Complaint   Patient presents with   • Follow-up     HPI: Patient returns to the office for follow-up visit with respect to her history of diarrhea. Specifically patient states that she is moving her bowels about once a day. Previously she had nearly uncontrolled diarrhea. Work-up in January included a colonoscopy. Note was made of 2 small polyps and diverticulosis. There was no evidence of visible colitis and biopsies were negative for microscopic colitis. Patient was placed on loperamide twice a day before meals and Questran. She has had good results with this combination. The working quite well. She does not really have much in the way of constipation except for very occasionally.   She is afraid to cut back on the medication as she is doing well presently    Historical Information   Past Medical History:   Diagnosis Date   • Acquired breast deformity     last assessed 10/16/14   • Aromatase inhibitor use    • Blood clot in vein    • CAD (coronary artery disease)    • Cancer (HCC)    • Cataract     OD   • Diabetes mellitus ([address])    • Difficulty walking up stairs    • Fibrocystic disease of breast    • Full dentures    • H/O tamoxifen therapy    • Heart palpitations    • Hemorrhoids    • Hormone replacement therapy     6 years, estrace, estractest   • Hx of radiation therapy    • Hyperlipidemia    • Hypertension    • Limb alert care status     LEFT   • Vision problem    • Wears glasses      Past Surgical History:   Procedure Laterality Date   • BREAST BIOPSY Left    • BREAST LUMPECTOMY Left    • BREAST SURGERY     • CARDIAC PACEMAKER PLACEMENT     • CATARACT EXTRACTION W/  INTRAOCULAR LENS IMPLANT Left    • CHOLECYSTECTOMY     • COLONOSCOPY  03/18/2010     sigmoid diverticulosis and internal hemorrhoids. • HERNIA REPAIR     • HYSTERECTOMY     • JOINT REPLACEMENT      fito knee replacements   • MASTECTOMY Left 10/07/2014    w/SLN bx   • [place] XCAPSL CTRC RMVL INSJ IO LENS PROSTH W/O ECP Right 11/16/2016    Procedure: EXTRACTION EXTRACAPSULAR CATARACT PHACO INTRAOCULAR LENS (IOL);   Surgeon: Tabitha Suarez MD;  Location:  MAIN OR;  Service: Ophthalmology   • REDUCTION MAMMAPLASTY Right 10/07/2014   • SENTINEL LYMPH NODE BIOPSY Left    • TONSILLECTOMY     • TUBAL LIGATION     • VAGINAL DELIVERY      X 4     Social History     Substance and Sexual Activity   Alcohol Use No     Social History     Substance and Sexual Activity   Drug Use No     Social History     Tobacco Use   Smoking Status Never   Smokeless Tobacco Never     Family History   Problem Relation Age of Onset   • No Known Problems Mother    • Dementia Father    • Cancer Paternal Aunt         larynx   • Stomach cancer Paternal Aunt         dx age 76s   • Cancer Paternal Aunt         leg,age unkown   • Heart disease Family         cardiac   • Diabetes Family    • Substance Abuse Neg Hx    • Colon cancer Neg Hx    • Colon polyps Neg Hx          Current Outpatient Medications:   •  Apple Cider Vinegar 500 MG TABS  •  ascorbic acid (VITAMIN C) 250 mg tablet  •  aspirin 81 MG tablet  •  atorvastatin (LIPITOR) 40 mg tablet  •  Biotin w/ Vitamins C & E (HAIR/SKIN/NAILS PO)  •  Calcium 500 MG tablet  •  Cholecalciferol (Vitamin D-3) 25 MCG (1000 UT) CAPS  •  cholestyramine (QUESTRAN) 4 g packet  •  Co-Enzyme Q10 200 MG CAPS  •  cyanocobalamin (VITAMIN B-12) 100 MCG tablet  •  Dapagliflozin Propanediol 10 MG TABS  •  Flaxseed, Linseed, (Flax Seed Oil) 1000 MG CAPS  •  gabapentin (NEURONTIN) 100 mg capsule  •  indapamide (LOZOL) 1.25 mg tablet  •  JANUVIA 100 MG tablet  •  loperamide (IMODIUM) 2 mg capsule  •  Magnesium Citrate 100 MG TABS  •  metoprolol succinate (TOPROL-XL) 25 mg 24 hr tablet  •  Misc Natural Products (FIBER 7 PO)  •  Multiple Vitamin (MULTI VITAMIN DAILY PO)  •  Multiple Vitamins-Minerals (AIRBORNE PO)  •  Omega-3 Fatty Acids (fish oil) 1,000 mg  •  other medication, see sig,  •  potassium bicarbonate (K-LYTE) 25 MEQ disintegrating tablet  •  rOPINIRole (REQUIP) 2 mg tablet  •  Apoaequorin (PREVAGEN PO)  •  Cinnamon 500 MG capsule  •  Empagliflozin 25 MG TABS  •  Lactobacillus-Inulin ([facility] PO)  •  Turmeric 500 MG CAPS  Allergies   Allergen Reactions   • Hydrocodone-Acetaminophen GI Intolerance   • Wound Dressing Adhesive    • Latex Rash     "redness"     Reviewed medications and allergies and updated as indicated    PHYSICAL EXAM:    Blood pressure 123/59, height 5' 5" (1.651 m), weight 78 kg (172 lb). Body mass index is 28.62 kg/m². General Appearance: NAD, cooperative, alert  Eyes: Anicteric, conjunctive a pink  ENT:  Normocephalic, atraumatic, normal mucosa.     Neck:  Supple, symmetrical, trachea midline  Resp:  Clear to auscultation bilaterally; no rales, rhonchi or wheezing; respirations unlabored   CV:  S1 S2, Regular rate and rhythm; no murmur, rub, or gallop. GI:  Soft, non-tender, non-distended; normal bowel sounds; no masses, no organomegaly   Rectal: Deferred  Musculoskeletal: No cyanosis, clubbing or edema. Normal ROM.   Skin:  No jaundice, rashes, or lesions   Heme/Lymph: No palpable cervical lymphadenopathy  Psych: Normal affect, good eye contact  Neuro: No gross deficits, AAOx3    :

## 2023-08-28 NOTE — PATIENT INSTRUCTIONS
[facility] - Outpatient Follow-up Note  Tamela Henao 82 y.o. female MRN: [id number]  Encounter: [id number]    ASSESSMENT AND PLAN:      1. Diarrhea, unspecified type  Patient reports diarrhea is under good control. Interestingly she has to take Questran twice a day and Imodium twice a day to keep symptoms under control. Colonoscopy in January and right colon biopsies negative for colitis or microscopic colitis    - cholestyramine (QUESTRAN) 4 g packet; Take 1 packet (4 g total) by mouth 2 (two) times a day with meals  Dispense: 180 packet; Refill: 3  - loperamide (IMODIUM) 2 mg capsule; Take 2 capsules (4 mg total) by mouth 2 (two) times a day before meals Two capsules twice daily  Dispense: 360 capsule; Refill: 3    -Does have occasional breakthrough symptoms. Does have occasional constipation and will hold the dose of Imodium    2. Irritable bowel syndrome with diarrhea  --seems to have resolved.   No abdominal pain at this time      Followup Appointment: [facility]

## 2024-02-05 DIAGNOSIS — R19.7 DIARRHEA, UNSPECIFIED TYPE: ICD-10-CM

## 2024-02-05 NOTE — TELEPHONE ENCOUNTER
Reason for call:   [x] Refill   [] Prior Auth  [] Other:     Office:   [] PCP/Provider -   [x] Specialty/Provider - Sincere Hernandez MD     Medication: cholestyramine     Dose/Frequency: 4 g / take 2 times daily    Quantity: 180    [facility]     Does the patient have enough for 3 days?   [x] Yes   [] No - Send as HP to POD

## 2024-02-06 RX ORDER — CHOLESTYRAMINE 4 G/9G
1 POWDER, FOR SUSPENSION ORAL 2 TIMES DAILY WITH MEALS
Qty: 180 PACKET | Refills: 0 | Status: SHIPPED | OUTPATIENT
Start: 2024-02-06 | End: 2024-05-06

## 2024-02-21 PROBLEM — Z12.11 SCREENING FOR COLON CANCER: Status: RESOLVED | Noted: 2020-09-15 | Resolved: 2024-02-21

## 2024-05-30 DIAGNOSIS — R19.7 DIARRHEA, UNSPECIFIED TYPE: ICD-10-CM

## 2024-05-31 RX ORDER — CHOLESTYRAMINE 4 G/9G
1 POWDER, FOR SUSPENSION ORAL 2 TIMES DAILY WITH MEALS
Qty: 180 PACKET | Refills: 1 | Status: SHIPPED | OUTPATIENT
Start: 2024-05-31 | End: 2024-06-06 | Stop reason: SDUPTHER

## 2024-06-06 DIAGNOSIS — R19.7 DIARRHEA, UNSPECIFIED TYPE: ICD-10-CM

## 2024-06-06 RX ORDER — CHOLESTYRAMINE 4 G/9G
1 POWDER, FOR SUSPENSION ORAL 2 TIMES DAILY WITH MEALS
Qty: 180 PACKET | Refills: 0 | Status: SHIPPED | OUTPATIENT
Start: 2024-06-06 | End: 2024-09-04

## 2024-06-06 NOTE — TELEPHONE ENCOUNTER
Patient called to check status, she said she is up to date on blood work and Dr Hernandez can get her lipid panel results from her pcp she just had labs 3 weeks ago. Please expedite she is running out of medication!    Reason for call:   [x] Refill   [] Prior Auth  [] Other:     Office:   [] PCP/Provider -   [x] Specialty/Provider - [facility]    Medication: Cholestyramine 4g packets    Dose/Frequency: 1 packet bid with meals    Quantity: 180    Pharm[facility]: [facility] [place] [address] [place], [place] [id number] [phone]     Does the patient have enough for 3 days?   [] Yes   [x] No - Send as HP to POD

## 2024-08-28 ENCOUNTER — OFFICE VISIT (OUTPATIENT)
Dept: GASTROENTEROLOGY | Facility: CLINIC | Age: 89
End: 2024-08-28
Payer: COMMERCIAL

## 2024-08-28 VITALS
DIASTOLIC BLOOD PRESSURE: 52 MMHG | WEIGHT: 173.2 LBS | BODY MASS INDEX: 28.86 KG/M2 | SYSTOLIC BLOOD PRESSURE: 102 MMHG | HEIGHT: 65 IN

## 2024-08-28 DIAGNOSIS — Z86.010 HISTORY OF COLON POLYPS: Primary | ICD-10-CM

## 2024-08-28 DIAGNOSIS — K58.0 IRRITABLE BOWEL SYNDROME WITH DIARRHEA: ICD-10-CM

## 2024-08-28 DIAGNOSIS — R19.7 DIARRHEA, UNSPECIFIED TYPE: ICD-10-CM

## 2024-08-28 PROCEDURE — G2211 COMPLEX E/M VISIT ADD ON: HCPCS | Performed by: INTERNAL MEDICINE

## 2024-08-28 PROCEDURE — 99213 OFFICE O/P EST LOW 20 MIN: CPT | Performed by: INTERNAL MEDICINE

## 2024-08-28 RX ORDER — LOPERAMIDE HCL 2 MG
2 CAPSULE ORAL 2 TIMES DAILY
Qty: 180 CAPSULE | Refills: 3 | Status: SHIPPED | OUTPATIENT
Start: 2024-08-28 | End: 2025-08-23

## 2024-08-28 RX ORDER — CHOLESTYRAMINE 4 G/9G
1 POWDER, FOR SUSPENSION ORAL 2 TIMES DAILY WITH MEALS
Qty: 180 PACKET | Refills: 2 | Status: SHIPPED | OUTPATIENT
Start: 2024-08-28 | End: 2025-05-25

## 2024-08-28 NOTE — PROGRESS NOTES
[facility] - Outpatient Follow-up Note  Paula Henao 89 y.o. female MRN: [id number]  Encounter: [id number]    ASSESSMENT AND PLAN:      1. Diarrhea, unspecified type  -With a previous history of chronic diarrhea.  This has been persistent for many years.  Colonoscopy early 2023 unremarkable except for diverticulosis and a couple of small polyps  -2 bowel movements a day on present regimen    - cholestyramine (QUESTRAN) 4 g packet; Take 1 packet (4 g total) by mouth 2 (two) times a day with meals  Dispense: 180 packet; Refill: 2  - loperamide (IMODIUM) 2 mg capsule; Take 1 capsule (2 mg total) by mouth 2 (two) times a day  Dispense: 180 capsule; Refill: 3    2. History of colon polyps  -- Subcentimeter polyps noted on most recent exam January 2023    3. Irritable bowel syndrome with diarrhea  --previous  abdominal pain associate with diarrhea.  No longer has the symptoms      Followup Appointment: 1 year   ______________________________________________________________________    Chief Complaint   Patient presents with    Annual Exam     Med management, needs refill on Questran     HPI: Returns to the office for medication refill.  She was manage for years with problems with significant diarrhea and abdominal bloating.  Also with lower abdominal pain.  Her symptoms are well-controlled at this time with cholestyramine twice a day and loperamide 2 mg twice a day.  Previously she would have multiple bowel movements over the course of the day.  She actually underwent colonoscopy January 2023.  Note was made of diverticulosis and some subcentimeter polyps.  Colon biopsies were negative for microscopic colitis.  Patient had IBS-D like symptoms but once her bowels got under better control she no longer has issues with abdominal pain    Historical Information   Past Medical History:   Diagnosis Date    Acquired breast deformity     last assessed 10/16/14    Aromatase inhibitor use      Blood clot in vein     CAD (coronary artery disease)     Cancer (HCC)     Cataract     OD    Diabetes mellitus (HCC)     Difficulty walking up stairs     Fibrocystic disease of breast     Full dentures     H/O tamoxifen therapy     Heart palpitations     Hemorrhoids     Hormone replacement therapy     6 years, estrace, estractest    Hx of radiation therapy     Hyperlipidemia     Hypertension     Limb alert care status     LEFT    Vision problem     Wears glasses      Past Surgical History:   Procedure Laterality Date    BREAST BIOPSY Left     BREAST LUMPECTOMY Left     BREAST SURGERY      CARDIAC PACEMAKER PLACEMENT      CATARACT EXTRACTION W/  INTRAOCULAR LENS IMPLANT Left     CHOLECYSTECTOMY      COLONOSCOPY  03/18/2010     sigmoid diverticulosis and internal hemorrhoids.    HERNIA REPAIR      HYSTERECTOMY      JOINT REPLACEMENT      fito knee replacements    MASTECTOMY Left 10/07/2014    w/SLN bx    NH XCAPSL CTRC RMVL INSJ IO LENS PROSTH W/O ECP Right 11/16/2016    Procedure: EXTRACTION EXTRACAPSULAR CATARACT PHACO INTRAOCULAR LENS (IOL);  Surgeon: Petey Walters MD;  Location:  MAIN OR;  Service: Ophthalmology    REDUCTION MAMMAPLASTY Right 10/07/2014    SENTINEL LYMPH NODE BIOPSY Left     TONSILLECTOMY      TUBAL LIGATION      VAGINAL DELIVERY      X 4     Social History     Substance and Sexual Activity   Alcohol Use No     Social History     Substance and Sexual Activity   Drug Use No     Social History     Tobacco Use   Smoking Status Never   Smokeless Tobacco Never     Family History   Problem Relation Age of Onset    No Known Problems Mother     Dementia Father     Cancer Paternal Aunt         larynx    Stomach cancer Paternal Aunt         dx age 70s    Cancer Paternal Aunt         leg,age unkown    Heart disease Family         cardiac    Diabetes Family     Substance Abuse Neg Hx     Colon cancer Neg Hx     Colon polyps Neg Hx          Current Outpatient Medications:     ascorbic acid (VITAMIN C) 250  "mg tablet    atorvastatin (LIPITOR) 40 mg tablet    Calcium 500 MG tablet    Cholecalciferol (Vitamin D-3) 25 MCG (1000 UT) CAPS    cholestyramine (QUESTRAN) 4 g packet    Co-Enzyme Q10 200 MG CAPS    cyanocobalamin (VITAMIN B-12) 100 MCG tablet    Empagliflozin 25 MG TABS    Flaxseed, Linseed, (Flax Seed Oil) 1000 MG CAPS    gabapentin (NEURONTIN) 100 mg capsule    JANUVIA 100 MG tablet    loperamide (IMODIUM) 2 mg capsule    Magnesium Citrate 100 MG TABS    metoprolol succinate (TOPROL-XL) 25 mg 24 hr tablet    Misc Natural Products (FIBER 7 PO)    Multiple Vitamin (MULTI VITAMIN DAILY PO)    Multiple Vitamins-Minerals (AIRBORNE PO)    Omega-3 Fatty Acids (fish oil) 1,000 mg    other medication, see sig,    potassium bicarbonate (K-LYTE) 25 MEQ disintegrating tablet    rOPINIRole (REQUIP) 2 mg tablet  Allergies   Allergen Reactions    Hydrocodone-Acetaminophen GI Intolerance    Wound Dressing Adhesive     Latex Rash     \"redness\"     Reviewed medications and allergies and updated as indicated    PHYSICAL EXAM:    Blood pressure 102/52, height 5' 5\" (1.651 m), weight 78.6 kg (173 lb 3.2 oz). Body mass index is 28.82 kg/m².  General Appearance: NAD, cooperative, alert  Eyes: Anicteric, conjunctiva pink  ENT:  Normocephalic, atraumatic, normal mucosa.    Neck:  Supple, symmetrical, trachea midline  Resp:  Clear to auscultation bilaterally; no rales, rhonchi or wheezing; respirations unlabored   CV:  S1 S2, Regular rate and rhythm; no murmur, rub, or gallop.  GI:  Soft, non-tender, non-distended; normal bowel sounds; no masses, no organomegaly   Rectal: Deferred  Musculoskeletal: No cyanosis, clubbing or edema. Normal ROM.  Skin:  No jaundice, rashes, or lesions   Heme/Lymph: No palpable cervical lymphadenopathy  Psych: Normal affect, good eye contact  Neuro: No gross deficits, AAOx3      "

## 2024-08-28 NOTE — PATIENT INSTRUCTIONS
[facility] - Outpatient Follow-up Note  Paula Henao 89 y.o. female MRN: [id number]  Encounter: [id number]    ASSESSMENT AND PLAN:      1. Diarrhea, unspecified type  -With a previous history of chronic diarrhea.  This has been persistent for many years.  Colonoscopy early 2023 unremarkable except for diverticulosis and a couple of small polyps  -2 bowel movements a day on present regimen    - cholestyramine (QUESTRAN) 4 g packet; Take 1 packet (4 g total) by mouth 2 (two) times a day with meals  Dispense: 180 packet; Refill: 2  - loperamide (IMODIUM) 2 mg capsule; Take 1 capsule (2 mg total) by mouth 2 (two) times a day  Dispense: 180 capsule; Refill: 3    2. History of colon polyps  -- Subcentimeter polyps noted on most recent exam January 2023    3. Irritable bowel syndrome with diarrhea  --previous  abdominal pain associate with diarrhea.  No longer has the symptoms      Followup Appointment: 1 year

## 2025-04-29 ENCOUNTER — TELEPHONE (OUTPATIENT)
Age: 89
End: 2025-04-29

## 2025-04-29 NOTE — TELEPHONE ENCOUNTER
Pt called to cancel an appt for 8-. I advised pt she doesn't have anything scheduled. Pt understood and no further assistance required